# Patient Record
Sex: FEMALE | Race: WHITE | NOT HISPANIC OR LATINO | Employment: PART TIME | URBAN - METROPOLITAN AREA
[De-identification: names, ages, dates, MRNs, and addresses within clinical notes are randomized per-mention and may not be internally consistent; named-entity substitution may affect disease eponyms.]

---

## 2017-01-13 ENCOUNTER — HOSPITAL ENCOUNTER (EMERGENCY)
Facility: HOSPITAL | Age: 27
Discharge: HOME | End: 2017-01-13
Attending: EMERGENCY MEDICINE | Admitting: EMERGENCY MEDICINE
Payer: COMMERCIAL

## 2017-01-13 ENCOUNTER — APPOINTMENT (EMERGENCY)
Dept: CT IMAGING | Facility: HOSPITAL | Age: 27
End: 2017-01-13
Payer: COMMERCIAL

## 2017-01-13 VITALS
OXYGEN SATURATION: 100 % | WEIGHT: 129.2 LBS | HEART RATE: 81 BPM | RESPIRATION RATE: 16 BRPM | SYSTOLIC BLOOD PRESSURE: 115 MMHG | TEMPERATURE: 98.3 F | DIASTOLIC BLOOD PRESSURE: 78 MMHG

## 2017-01-13 DIAGNOSIS — G43.909 MIGRAINE WITHOUT STATUS MIGRAINOSUS, NOT INTRACTABLE, UNSPECIFIED MIGRAINE TYPE: Primary | ICD-10-CM

## 2017-01-13 LAB
ALBUMIN SERPL BCP-MCNC: 4 G/DL (ref 3.5–5)
ALP SERPL-CCNC: 69 U/L (ref 46–116)
ALT SERPL W P-5'-P-CCNC: 23 U/L (ref 12–78)
ANION GAP SERPL CALCULATED.3IONS-SCNC: 9 MMOL/L (ref 4–13)
AST SERPL W P-5'-P-CCNC: 15 U/L (ref 5–45)
BASOPHILS # BLD AUTO: 0.04 THOUSANDS/ΜL (ref 0–0.1)
BASOPHILS NFR BLD AUTO: 1 % (ref 0–1)
BILIRUB SERPL-MCNC: 0.5 MG/DL (ref 0.2–1)
BUN SERPL-MCNC: 12 MG/DL (ref 5–25)
CALCIUM SERPL-MCNC: 8.9 MG/DL (ref 8.3–10.1)
CHLORIDE SERPL-SCNC: 101 MMOL/L (ref 100–108)
CLARITY, POC: CLEAR
CO2 SERPL-SCNC: 25 MMOL/L (ref 21–32)
COLOR, POC: YELLOW
CREAT SERPL-MCNC: 0.64 MG/DL (ref 0.6–1.3)
EOSINOPHIL # BLD AUTO: 0.07 THOUSAND/ΜL (ref 0–0.61)
EOSINOPHIL NFR BLD AUTO: 1 % (ref 0–6)
ERYTHROCYTE [DISTWIDTH] IN BLOOD BY AUTOMATED COUNT: 13.3 % (ref 11.6–15.1)
EXT BILIRUBIN, UA: NEGATIVE
EXT BLOOD URINE: NEGATIVE
EXT GLUCOSE, UA: NEGATIVE
EXT KETONES: NORMAL
EXT NITRITE, UA: NEGATIVE
EXT PH, UA: 7.5
EXT PROTEIN, UA: NORMAL
EXT SPECIFIC GRAVITY, UA: 1.01
EXT UROBILINOGEN: NEGATIVE
GFR SERPL CREATININE-BSD FRML MDRD: >60 ML/MIN/1.73SQ M
GLUCOSE SERPL-MCNC: 83 MG/DL (ref 65–140)
GLUCOSE SERPL-MCNC: 91 MG/DL (ref 65–140)
HCG UR QL: NEGATIVE
HCT VFR BLD AUTO: 37.6 % (ref 34.8–46.1)
HGB BLD-MCNC: 12.1 G/DL (ref 11.5–15.4)
LYMPHOCYTES # BLD AUTO: 1.39 THOUSANDS/ΜL (ref 0.6–4.47)
LYMPHOCYTES NFR BLD AUTO: 17 % (ref 14–44)
MCH RBC QN AUTO: 24.8 PG (ref 26.8–34.3)
MCHC RBC AUTO-ENTMCNC: 32.2 G/DL (ref 31.4–37.4)
MCV RBC AUTO: 77 FL (ref 82–98)
MONOCYTES # BLD AUTO: 0.39 THOUSAND/ΜL (ref 0.17–1.22)
MONOCYTES NFR BLD AUTO: 5 % (ref 4–12)
NEUTROPHILS # BLD AUTO: 6.43 THOUSANDS/ΜL (ref 1.85–7.62)
NEUTS SEG NFR BLD AUTO: 76 % (ref 43–75)
PLATELET # BLD AUTO: 299 THOUSANDS/UL (ref 149–390)
PMV BLD AUTO: 10.2 FL (ref 8.9–12.7)
POTASSIUM SERPL-SCNC: 3.6 MMOL/L (ref 3.5–5.3)
PROT SERPL-MCNC: 7.7 G/DL (ref 6.4–8.2)
RBC # BLD AUTO: 4.87 MILLION/UL (ref 3.81–5.12)
SODIUM SERPL-SCNC: 135 MMOL/L (ref 136–145)
WBC # BLD AUTO: 8.32 THOUSAND/UL (ref 4.31–10.16)
WBC # BLD EST: NEGATIVE 10*3/UL

## 2017-01-13 PROCEDURE — 96374 THER/PROPH/DIAG INJ IV PUSH: CPT

## 2017-01-13 PROCEDURE — 96375 TX/PRO/DX INJ NEW DRUG ADDON: CPT

## 2017-01-13 PROCEDURE — 70450 CT HEAD/BRAIN W/O DYE: CPT

## 2017-01-13 PROCEDURE — 36415 COLL VENOUS BLD VENIPUNCTURE: CPT | Performed by: PHYSICIAN ASSISTANT

## 2017-01-13 PROCEDURE — 82948 REAGENT STRIP/BLOOD GLUCOSE: CPT

## 2017-01-13 PROCEDURE — 85025 COMPLETE CBC W/AUTO DIFF WBC: CPT | Performed by: PHYSICIAN ASSISTANT

## 2017-01-13 PROCEDURE — 99284 EMERGENCY DEPT VISIT MOD MDM: CPT

## 2017-01-13 PROCEDURE — 81025 URINE PREGNANCY TEST: CPT | Performed by: PHYSICIAN ASSISTANT

## 2017-01-13 PROCEDURE — 80053 COMPREHEN METABOLIC PANEL: CPT | Performed by: PHYSICIAN ASSISTANT

## 2017-01-13 PROCEDURE — 96360 HYDRATION IV INFUSION INIT: CPT

## 2017-01-13 PROCEDURE — 81002 URINALYSIS NONAUTO W/O SCOPE: CPT | Performed by: PHYSICIAN ASSISTANT

## 2017-01-13 PROCEDURE — 96361 HYDRATE IV INFUSION ADD-ON: CPT

## 2017-01-13 RX ORDER — KETOROLAC TROMETHAMINE 30 MG/ML
30 INJECTION, SOLUTION INTRAMUSCULAR; INTRAVENOUS ONCE
Status: COMPLETED | OUTPATIENT
Start: 2017-01-13 | End: 2017-01-13

## 2017-01-13 RX ORDER — METOCLOPRAMIDE HYDROCHLORIDE 5 MG/ML
10 INJECTION INTRAMUSCULAR; INTRAVENOUS ONCE
Status: COMPLETED | OUTPATIENT
Start: 2017-01-13 | End: 2017-01-13

## 2017-01-13 RX ORDER — DIPHENHYDRAMINE HYDROCHLORIDE 50 MG/ML
25 INJECTION INTRAMUSCULAR; INTRAVENOUS ONCE
Status: COMPLETED | OUTPATIENT
Start: 2017-01-13 | End: 2017-01-13

## 2017-01-13 RX ADMIN — SODIUM CHLORIDE 1000 ML: 0.9 INJECTION, SOLUTION INTRAVENOUS at 14:54

## 2017-01-13 RX ADMIN — METOCLOPRAMIDE 10 MG: 5 INJECTION, SOLUTION INTRAMUSCULAR; INTRAVENOUS at 15:11

## 2017-01-13 RX ADMIN — DIPHENHYDRAMINE HYDROCHLORIDE 25 MG: 50 INJECTION, SOLUTION INTRAMUSCULAR; INTRAVENOUS at 15:10

## 2017-01-13 RX ADMIN — KETOROLAC TROMETHAMINE 30 MG: 30 INJECTION, SOLUTION INTRAMUSCULAR at 16:13

## 2017-01-16 ENCOUNTER — ALLSCRIPTS OFFICE VISIT (OUTPATIENT)
Dept: OTHER | Facility: OTHER | Age: 27
End: 2017-01-16

## 2017-01-16 ENCOUNTER — TRANSCRIBE ORDERS (OUTPATIENT)
Dept: ADMINISTRATIVE | Facility: HOSPITAL | Age: 27
End: 2017-01-16

## 2017-01-16 DIAGNOSIS — R93.0 ABNORMAL FINDINGS ON DIAGNOSTIC IMAGING OF SKULL AND HEAD, NOT ELSEWHERE CLASSIFIED: ICD-10-CM

## 2017-01-16 DIAGNOSIS — G43.909 MIGRAINE WITHOUT STATUS MIGRAINOSUS, NOT INTRACTABLE, UNSPECIFIED MIGRAINE TYPE: ICD-10-CM

## 2017-01-16 DIAGNOSIS — R93.0 FAMILIAL ENLARGEMENT OF THE SELLA TURCICA: Primary | ICD-10-CM

## 2017-01-16 DIAGNOSIS — H53.9 UNSPECIFIED VISUAL DISTURBANCE: ICD-10-CM

## 2017-01-16 DIAGNOSIS — G43.909 MIGRAINE WITHOUT STATUS MIGRAINOSUS, NOT INTRACTABLE: ICD-10-CM

## 2017-01-16 DIAGNOSIS — H53.9 VISUAL DISTURBANCE: ICD-10-CM

## 2017-02-17 ENCOUNTER — HOSPITAL ENCOUNTER (OUTPATIENT)
Dept: MRI IMAGING | Facility: HOSPITAL | Age: 27
Discharge: HOME | End: 2017-02-17
Payer: COMMERCIAL

## 2017-02-17 DIAGNOSIS — R93.0 ABNORMAL FINDINGS ON DIAGNOSTIC IMAGING OF SKULL AND HEAD, NOT ELSEWHERE CLASSIFIED: ICD-10-CM

## 2017-02-17 DIAGNOSIS — G43.909 MIGRAINE WITHOUT STATUS MIGRAINOSUS, NOT INTRACTABLE: ICD-10-CM

## 2017-02-17 DIAGNOSIS — H53.9 VISUAL DISTURBANCE: ICD-10-CM

## 2017-02-17 PROCEDURE — 70553 MRI BRAIN STEM W/O & W/DYE: CPT

## 2017-02-17 PROCEDURE — A9585 GADOBUTROL INJECTION: HCPCS | Performed by: NURSE PRACTITIONER

## 2017-02-17 RX ADMIN — GADOBUTROL 5 ML: 604.72 INJECTION INTRAVENOUS at 15:45

## 2017-02-20 ENCOUNTER — GENERIC CONVERSION - ENCOUNTER (OUTPATIENT)
Dept: OTHER | Facility: OTHER | Age: 27
End: 2017-02-20

## 2017-10-24 ENCOUNTER — ALLSCRIPTS OFFICE VISIT (OUTPATIENT)
Dept: OTHER | Facility: OTHER | Age: 27
End: 2017-10-24

## 2017-10-25 ENCOUNTER — HOSPITAL ENCOUNTER (EMERGENCY)
Facility: HOSPITAL | Age: 27
Discharge: HOME | End: 2017-10-25
Admitting: EMERGENCY MEDICINE
Payer: COMMERCIAL

## 2017-10-25 ENCOUNTER — APPOINTMENT (EMERGENCY)
Dept: RADIOLOGY | Facility: HOSPITAL | Age: 27
End: 2017-10-25
Payer: COMMERCIAL

## 2017-10-25 VITALS
DIASTOLIC BLOOD PRESSURE: 56 MMHG | TEMPERATURE: 98.3 F | RESPIRATION RATE: 20 BRPM | HEART RATE: 102 BPM | OXYGEN SATURATION: 97 % | SYSTOLIC BLOOD PRESSURE: 102 MMHG

## 2017-10-25 DIAGNOSIS — B34.9 VIRAL SYNDROME: Primary | ICD-10-CM

## 2017-10-25 LAB
ALBUMIN SERPL BCP-MCNC: 3.9 G/DL (ref 3.5–5)
ALP SERPL-CCNC: 89 U/L (ref 46–116)
ALT SERPL W P-5'-P-CCNC: 99 U/L (ref 12–78)
ANION GAP SERPL CALCULATED.3IONS-SCNC: 7 MMOL/L (ref 4–13)
AST SERPL W P-5'-P-CCNC: 38 U/L (ref 5–45)
BASOPHILS # BLD AUTO: 0.11 THOUSANDS/ΜL (ref 0–0.1)
BASOPHILS NFR BLD AUTO: 2 % (ref 0–1)
BILIRUB SERPL-MCNC: 0.9 MG/DL (ref 0.2–1)
BUN SERPL-MCNC: 10 MG/DL (ref 5–25)
CALCIUM SERPL-MCNC: 9 MG/DL (ref 8.3–10.1)
CHLORIDE SERPL-SCNC: 103 MMOL/L (ref 100–108)
CO2 SERPL-SCNC: 28 MMOL/L (ref 21–32)
CREAT SERPL-MCNC: 0.74 MG/DL (ref 0.6–1.3)
EOSINOPHIL # BLD AUTO: 0.13 THOUSAND/ΜL (ref 0–0.61)
EOSINOPHIL NFR BLD AUTO: 3 % (ref 0–6)
ERYTHROCYTE [DISTWIDTH] IN BLOOD BY AUTOMATED COUNT: 13.7 % (ref 11.6–15.1)
EXT PREG TEST URINE: NEGATIVE
GFR SERPL CREATININE-BSD FRML MDRD: 112 ML/MIN/1.73SQ M
GLUCOSE SERPL-MCNC: 85 MG/DL (ref 65–140)
HCT VFR BLD AUTO: 35.3 % (ref 34.8–46.1)
HGB BLD-MCNC: 11.9 G/DL (ref 11.5–15.4)
LYMPHOCYTES # BLD AUTO: 2.23 THOUSANDS/ΜL (ref 0.6–4.47)
LYMPHOCYTES NFR BLD AUTO: 43 % (ref 14–44)
MCH RBC QN AUTO: 27.1 PG (ref 26.8–34.3)
MCHC RBC AUTO-ENTMCNC: 33.7 G/DL (ref 31.4–37.4)
MCV RBC AUTO: 80 FL (ref 82–98)
MONOCYTES # BLD AUTO: 0.29 THOUSAND/ΜL (ref 0.17–1.22)
MONOCYTES NFR BLD AUTO: 6 % (ref 4–12)
NEUTROPHILS # BLD AUTO: 2.48 THOUSANDS/ΜL (ref 1.85–7.62)
NEUTS SEG NFR BLD AUTO: 46 % (ref 43–75)
PLATELET # BLD AUTO: 173 THOUSANDS/UL (ref 149–390)
PMV BLD AUTO: 9.4 FL (ref 8.9–12.7)
POTASSIUM SERPL-SCNC: 3.7 MMOL/L (ref 3.5–5.3)
PROT SERPL-MCNC: 7.8 G/DL (ref 6.4–8.2)
RBC # BLD AUTO: 4.39 MILLION/UL (ref 3.81–5.12)
S PYO AG THROAT QL: NEGATIVE
SODIUM SERPL-SCNC: 138 MMOL/L (ref 136–145)
WBC # BLD AUTO: 5.24 THOUSAND/UL (ref 4.31–10.16)

## 2017-10-25 PROCEDURE — 99284 EMERGENCY DEPT VISIT MOD MDM: CPT

## 2017-10-25 PROCEDURE — 36415 COLL VENOUS BLD VENIPUNCTURE: CPT | Performed by: PHYSICIAN ASSISTANT

## 2017-10-25 PROCEDURE — 71020 HB CHEST X-RAY 2VW FRONTAL&LATL: CPT

## 2017-10-25 PROCEDURE — 87147 CULTURE TYPE IMMUNOLOGIC: CPT | Performed by: PHYSICIAN ASSISTANT

## 2017-10-25 PROCEDURE — 87430 STREP A AG IA: CPT | Performed by: PHYSICIAN ASSISTANT

## 2017-10-25 PROCEDURE — 85025 COMPLETE CBC W/AUTO DIFF WBC: CPT | Performed by: PHYSICIAN ASSISTANT

## 2017-10-25 PROCEDURE — 87070 CULTURE OTHR SPECIMN AEROBIC: CPT | Performed by: PHYSICIAN ASSISTANT

## 2017-10-25 PROCEDURE — 86308 HETEROPHILE ANTIBODY SCREEN: CPT | Performed by: PHYSICIAN ASSISTANT

## 2017-10-25 PROCEDURE — 96374 THER/PROPH/DIAG INJ IV PUSH: CPT

## 2017-10-25 PROCEDURE — 96361 HYDRATE IV INFUSION ADD-ON: CPT

## 2017-10-25 PROCEDURE — 81025 URINE PREGNANCY TEST: CPT | Performed by: PHYSICIAN ASSISTANT

## 2017-10-25 PROCEDURE — 80053 COMPREHEN METABOLIC PANEL: CPT | Performed by: PHYSICIAN ASSISTANT

## 2017-10-25 RX ORDER — ONDANSETRON 2 MG/ML
4 INJECTION INTRAMUSCULAR; INTRAVENOUS ONCE
Status: COMPLETED | OUTPATIENT
Start: 2017-10-25 | End: 2017-10-25

## 2017-10-25 RX ORDER — ONDANSETRON 4 MG/1
4 TABLET, FILM COATED ORAL EVERY 8 HOURS PRN
Qty: 10 TABLET | Refills: 0 | Status: SHIPPED | OUTPATIENT
Start: 2017-10-25 | End: 2018-03-13

## 2017-10-25 RX ADMIN — ONDANSETRON 4 MG: 2 INJECTION INTRAMUSCULAR; INTRAVENOUS at 21:11

## 2017-10-25 RX ADMIN — SODIUM CHLORIDE 1000 ML: 0.9 INJECTION, SOLUTION INTRAVENOUS at 21:10

## 2017-10-25 NOTE — PROGRESS NOTES
Assessment  1  Acute upper respiratory infection (465 9) (J06 9)   2  Viral gastroenteritis (008 8) (A08 4)    Plan  Viral gastroenteritis    · Avoid caffeine for 2 weeks ; Status:Complete;   Done: [id number]   · Drink at least 6 glasses of clear liquids a day ; Status:Complete;   Done: 86TGZ7969   · Good hand washing is one of the best ways to control the spread of germs ;  Status:Complete;   Done: 91DIR1931   · We suggest that you follow a special diet until your diarrhea is better ; Status:Complete;    Done: 55FBB4013   · Call [phone] if: The symptoms are not better in 7 days ; Status:Complete;   Done:  49GTS6146   · Call [phone] if: Your temperature is higher than 101F ; Status:Complete;   Done:  08SNP0282   · Seek Immediate Medical Attention if: You have pain in your abdomen ; Status:Complete;    Done: 03THC5670    Discussion/Summary    Discussed with her symptomatic care and lot of fluids and Tylenol for pain her ear exam is normal abdomen is nontender and lungs are clear her throat is not showing any exudate or erythema  if not better  The patient was counseled regarding instructions for management,-- impressions,-- importance of compliance with treatment  Chief Complaint  Throwing up, throat and left ear ache  History of Present Illness  HPI: Pt presented today c/o being sick for one day  Pt states she was vomiting for half of the day yesterday, approximately 6 times  Pt states today she has no vomiting but has had one episode of diarrhea  Pt has had nausea associated with this  Pt denies any blood in the watery, loose stools  Pt also complaining of sore throat today and L ear pain  Pt stated she has had chills and body aches but no reported fevers  Pt reported some headaches and neck pain as well  Pt denied any recent sick contacts   says her symptoms started with nasal congestion and little bit cough for few days      Review of Systems    Constitutional: as noted in HPI    ENT: as noted in HPI  Cardiovascular: no complaints of slow or fast heart rate, no chest pain, no palpitations, no leg claudication or lower extremity edema  Respiratory: no complaints of shortness of breath, no wheezing, no dyspnea on exertion, no orthopnea or PND  Gastrointestinal: no complaints of abdominal pain, no constipation, no nausea or diarrhea, no vomiting, no bloody stools  Genitourinary: no complaints of dysuria, no incontinence, no pelvic pain, no dysmenorrhea, no vaginal discharge or abnormal vaginal bleeding  Musculoskeletal: no complaints of arthralgia, no myalgia, no joint swelling or stiffness, no limb pain or swelling  Integumentary: no complaints of skin rash or lesion, no itching or dry skin, no skin wounds  Neurological: no complaints of headache, no confusion, no numbness or tingling, no dizziness or fainting  ROS reviewed  Active Problems  1  Abnormal CT scan, head (793 0) (R93 0)   2  Encounter for gynecological examination (V72 31) (Z01 419)   3  Migraine (346 90) (G43 909)   4  Previous  section complicating pregnancy, antepartum condition or   complication (835 63) (N43 740)   5  Vaginal yeast infection (112 1) (B37 3)   6  Vision changes (368 9) (H53 9)    Past Medical History  1  History of 1st trimester screening (V28 89) (Z36 9)   2  History of Acute reaction to stress (308 9) (F43 0)   3  History of Acute upper respiratory infection (465 9) (J06 9)   4  History of Acute upper respiratory infection (465 9) (J06 9)   5  History of Cough (786 2) (R05)   6  History of acute bronchitis (V12 69) (Z87 09)   7  History of acute pharyngitis (V12 69) (Z87 09)   8  History of acute sinusitis (V12 69) (Z87 09)   9  History of allergic rhinitis (V12 69) (Z87 09)   10  History of gestational diabetes (V12 21) (Z86 32)   11  History of gestational diabetes (V12 21) (Z86 32)   12  History of paronychia of finger (V13 3) (Z87 2)   13   History of viral gastroenteritis (V12 09) (Z86 19)   14  History of Hyperemesis gravidarum, antepartum (643 03) (O21 0)   15  History of Impacted cerumen, unspecified laterality (380 4) (H61 20)   16  History of Lump in neck (784 2) (R22 1)   17  History of Palpitations (785 1) (R00 2)   18  History of Vaginal yeast infection (112 1) (B37 3)   19  History of Well adult on routine health check (V70 0) (Z00 00)  Active Problems And Past Medical History Reviewed: The active problems and past medical history were reviewed and updated today  Family History  Mother    1  Family history of Diabetes (250 00) (E11 9)   2  Family history of depression (V17 0) (Z81 8)  Family History    3  Family history of Depression (311) (F32 9)    Social History   · Former smoker (X63 73) (W46 566)   · No alcohol use  The social history was reviewed and updated today  Surgical History  1  History of Appendectomy   2  History of Appendectomy   3  History of  Section Low Transverse   4  History of  Section Low Transverse   5  History of Tubal Ligation    Current Meds   1  Iron 240 (27 Fe) MG Oral Tablet; Therapy: 51KTE1113 to Recorded    The medication list was reviewed and updated today  Allergies  1  Amoxicillin TABS   2  Vicodin TABS   3  ZyrTEC Allergy TABS   4  AMOXICILLIN   5  Amoxicillin TABS   6  Vicodin TABS   7  Zyrtec TABS  8  Other    Vitals   Recorded: 47ZBJ1437 07:10PM   Heart Rate 84   Respiration 17   Systolic 857   Diastolic 70   Height 4 ft 11 in   Weight 138 lb 4 oz   BMI Calculated 27 92   BSA Calculated 1 58   O2 Saturation 97     Physical Exam    Constitutional   General appearance: No acute distress, well appearing and well nourished  Eyes   Conjunctiva and lids: No swelling, erythema or discharge  Pupils and irises: Equal, round and reactive to light  Ears, Nose, Mouth, and Throat   External inspection of ears and nose: Normal     Otoscopic examination: Tympanic membranes translucent with normal light reflex   Canals patent without erythema  Nasal mucosa, septum, and turbinates: Normal without edema or erythema  Oropharynx: Normal with no erythema, edema, exudate or lesions  Pulmonary   Respiratory effort: No increased work of breathing or signs of respiratory distress  Auscultation of lungs: Clear to auscultation  Cardiovascular   Palpation of heart: Normal PMI, no thrills  Auscultation of heart: Normal rate and rhythm, normal S1 and S2, without murmurs  Abdomen   Abdomen: Non-tender, no masses  Liver and spleen: No hepatomegaly or splenomegaly  Lymphatic   Palpation of lymph nodes in neck: No lymphadenopathy  Musculoskeletal   Gait and station: Normal     Inspection/palpation of joints, bones, and muscles: Normal     Skin   Skin and subcutaneous tissue: Normal without rashes or lesions  Neurologic   Cranial nerves: Cranial nerves 2-12 intact  Sensation: No sensory loss      Psychiatric   Orientation to person, place, and time: Normal          Signatures   Electronically signed by : NARENDRA Gee ; Oct 24 2017  7:45PM EST                       (Author)

## 2017-10-26 LAB — HETEROPH AB SER QL: POSITIVE

## 2017-10-26 NOTE — ED PROVIDER NOTES
History  Chief Complaint   Patient presents with    Vomiting     Pt c/o N/V/D w/ a sore throat, runny nose and ear pain starting a week ago, states her cousin had [place] and wants to get checked  Denies fevers   URI     This is a 52-year-old female patient presents nausea vomiting and diarrhea with a sore throat runny nose and left ear pain for the last 3 days  She has been able to keep fluids down but is still struggling with food due to nausea  Has chills no fevers were checked  No cough no congestion just a bit of a runny nose  No headache no blurred vision or double vision  Her throat hurts when she swallows but she is able she is not drooling  She has ear pain but is able to hear  She is concerned because her cousin was diagnosed with mononucleosis  She has no abdominal pain no urgency frequency or dysuria  Differential diagnosis includes not limited to otitis media, strep throat, mononucleosis, viral syndrome  Patient does have a tubal ligation            None       History reviewed  No pertinent past medical history  Past Surgical History:   Procedure Laterality Date    APPENDECTOMY       SECTION      two       History reviewed  No pertinent family history  I have reviewed and agree with the history as documented  Social History   Substance Use Topics    Smoking status: Never Smoker    Smokeless tobacco: Not on file    Alcohol use No        Review of Systems   All other systems reviewed and are negative        Physical Exam  ED Triage Vitals [10/25/17 181]   Temperature Pulse Respirations Blood Pressure SpO2   98 3 °F (36 8 °C) (!) 111 18 149/77 99 %      Temp Source Heart Rate Source Patient Position - Orthostatic VS BP Location FiO2 (%)   Oral Monitor Sitting Left arm --      Pain Score       --           Orthostatic Vital Signs  Vitals:    10/25/17 1818 10/25/17 2232   BP: 149/77 128/67   Pulse: (!) 111 95   Patient Position - Orthostatic VS: Sitting Lying       Physical Exam   Constitutional: She appears well-developed and well-nourished  HENT:   Head: Normocephalic and atraumatic  Right Ear: External ear normal    Left Ear: External ear normal    Nose: Nose normal    Mouth/Throat: Uvula is midline and mucous membranes are normal  Posterior oropharyngeal erythema present  No tonsillar abscesses  Tonsils are 2+ on the right  Tonsils are 2+ on the left  No tonsillar exudate  Eyes: Conjunctivae are normal  Pupils are equal, round, and reactive to light  Neck: Normal range of motion  Neck supple  Cardiovascular: Normal rate and regular rhythm  Pulmonary/Chest: Effort normal and breath sounds normal    Abdominal: Soft  Bowel sounds are normal  There is no tenderness  Lymphadenopathy:     She has cervical adenopathy  Neurological: She is alert  Skin: Skin is warm  Psychiatric: She has a normal mood and affect  Her behavior is normal    Nursing note and vitals reviewed        ED Medications  Medications   sodium chloride 0 9 % bolus 1,000 mL (0 mL Intravenous Stopped 10/25/17 2249)   ondansetron (ZOFRAN) injection 4 mg (4 mg Intravenous Given 10/25/17 2111)       Diagnostic Studies  Results Reviewed     Procedure Component Value Units Date/Time    Comprehensive metabolic panel [[id number]]  (Abnormal) Collected:  10/25/17 2109    Lab Status:  Final result Specimen:  Blood from Arm, Right Updated:  10/25/17 2143     Sodium 138 mmol/L      Potassium 3 7 mmol/L      Chloride 103 mmol/L      CO2 28 mmol/L      Anion Gap 7 mmol/L      BUN 10 mg/dL      Creatinine 0 74 mg/dL      Glucose 85 mg/dL      Calcium 9 0 mg/dL      AST 38 U/L      ALT 99 (H) U/L      Alkaline Phosphatase 89 U/L      Total Protein 7 8 g/dL      Albumin 3 9 g/dL      Total Bilirubin 0 90 mg/dL      eGFR 112 ml/min/1 73sq m     Narrative:         National Kidney Disease Education Program recommendations are as follows:  GFR calculation is accurate only with a steady state creatinine  Chronic Kidney disease less than 60 ml/min/1 73 sq  meters  Kidney failure less than 15 ml/min/1 73 sq  meters  Rapid Beta strep screen [[id number]]  (Normal) Collected:  10/25/17 2130    Lab Status:  Final result Specimen:  Throat from Throat Updated:  10/25/17 2142     Rapid Strep A Screen Negative    Throat culture [[id number]] Collected:  10/25/17 2130    Lab Status: In process Specimen:  Throat from Throat Updated:  10/25/17 2142    CBC and differential [[id number]]  (Abnormal) Collected:  10/25/17 2109    Lab Status:  Final result Specimen:  Blood from Arm, Right Updated:  10/25/17 2121     WBC 5 24 Thousand/uL      RBC 4 39 Million/uL      Hemoglobin 11 9 g/dL      Hematocrit 35 3 %      MCV 80 (L) fL      MCH 27 1 pg      MCHC 33 7 g/dL      RDW 13 7 %      MPV 9 4 fL      Platelets 520 Thousands/uL      Neutrophils Relative 46 %      Lymphocytes Relative 43 %      Monocytes Relative 6 %      Eosinophils Relative 3 %      Basophils Relative 2 (H) %      Neutrophils Absolute 2 48 Thousands/µL      Lymphocytes Absolute 2 23 Thousands/µL      Monocytes Absolute 0 29 Thousand/µL      Eosinophils Absolute 0 13 Thousand/µL      Basophils Absolute 0 11 (H) Thousands/µL     Mononucleosis screen [[id number]] Collected:  10/25/17 2109    Lab Status: In process Specimen:  Blood from Arm, Right Updated:  10/25/17 2116    POCT pregnancy, urine [[id number]]  (Normal) Resulted:  10/25/17 2110    Lab Status:  Final result Updated:  10/25/17 2110     EXT PREG TEST UR (Ref: Negative) Negative                 XR chest 2 views   ED Interpretation by Sarah Hurt PA-C (10/25 2307)   nad                 Procedures  Procedures       Phone Contacts  ED Phone Contact    ED Course  ED Course                                MDM  Number of Diagnoses or Management Options  Diagnosis management comments: This 52-year-old patient whose had several days of vomiting and diarrhea some congestion ear pain and sore throat    She was given Zofran and fluids she states she feels considerably better  Negative rapid strep negative chest x-ray negative atypical lymphocytes no significant transaminitis  Patient be discharged viral syndrome we will be awaiting Monospot  Amount and/or Complexity of Data Reviewed  Clinical lab tests: ordered and reviewed  Tests in the radiology section of CPT®: ordered and reviewed      CritCare Time    Disposition  Final diagnoses:   Viral syndrome     Time reflects when diagnosis was documented in both MDM as applicable and the Disposition within this note     Time User Action Codes Description Comment    10/25/2017 11:09 PM Dinjordani, [address] [facility] Add [B34 9] Viral syndrome       ED Disposition     ED Disposition Condition Comment    Discharge  Xochitl Chun discharge to home/self care  Condition at discharge: Good        Follow-up Information     Follow up With Specialties Details Why [address], [address] [facility] Schedule an appointment as soon as possible for a visit  [address]  [phone]          Patient's Medications   Discharge Prescriptions    ONDANSETRON (ZOFRAN) 4 MG TABLET    Take 1 tablet by mouth every 8 (eight) hours as needed for nausea or vomiting for up to 3 days       Start Date: 10/25/2017End Date: 10/28/2017       Order Dose: 4 mg       Quantity: 10 tablet    Refills: 0     No discharge procedures on file      ED Provider  Electronically Signed by           Celeste Hogan PA-C  10/25/17 9865

## 2017-10-27 ENCOUNTER — APPOINTMENT (EMERGENCY)
Dept: CT IMAGING | Facility: HOSPITAL | Age: 27
End: 2017-10-27
Payer: COMMERCIAL

## 2017-10-27 ENCOUNTER — HOSPITAL ENCOUNTER (EMERGENCY)
Facility: HOSPITAL | Age: 27
Discharge: HOME | End: 2017-10-28
Attending: EMERGENCY MEDICINE | Admitting: EMERGENCY MEDICINE
Payer: COMMERCIAL

## 2017-10-27 DIAGNOSIS — R16.1 SPLENOMEGALY: Primary | ICD-10-CM

## 2017-10-27 DIAGNOSIS — R10.9 ABDOMINAL PAIN: ICD-10-CM

## 2017-10-27 LAB
ALBUMIN SERPL BCP-MCNC: 3.6 G/DL (ref 3.5–5)
ALP SERPL-CCNC: 89 U/L (ref 46–116)
ALT SERPL W P-5'-P-CCNC: 101 U/L (ref 12–78)
ANION GAP SERPL CALCULATED.3IONS-SCNC: 9 MMOL/L (ref 4–13)
AST SERPL W P-5'-P-CCNC: 54 U/L (ref 5–45)
BASOPHILS # BLD MANUAL: 0 THOUSAND/UL (ref 0–0.1)
BASOPHILS NFR MAR MANUAL: 0 % (ref 0–1)
BILIRUB SERPL-MCNC: 0.7 MG/DL (ref 0.2–1)
BUN SERPL-MCNC: 8 MG/DL (ref 5–25)
CALCIUM SERPL-MCNC: 8.7 MG/DL (ref 8.3–10.1)
CHLORIDE SERPL-SCNC: 104 MMOL/L (ref 100–108)
CLARITY, POC: CLEAR
CO2 SERPL-SCNC: 27 MMOL/L (ref 21–32)
COLOR, POC: YELLOW
CREAT SERPL-MCNC: 0.84 MG/DL (ref 0.6–1.3)
EOSINOPHIL # BLD MANUAL: 0.15 THOUSAND/UL (ref 0–0.4)
EOSINOPHIL NFR BLD MANUAL: 3 % (ref 0–6)
ERYTHROCYTE [DISTWIDTH] IN BLOOD BY AUTOMATED COUNT: 13.9 % (ref 11.6–15.1)
EXT BILIRUBIN, UA: NEGATIVE
EXT BLOOD URINE: ABNORMAL
EXT GLUCOSE, UA: NEGATIVE
EXT KETONES: NEGATIVE
EXT NITRITE, UA: NEGATIVE
EXT PH, UA: 7
EXT PREG TEST URINE: NEGATIVE
EXT PROTEIN, UA: ABNORMAL
EXT SPECIFIC GRAVITY, UA: 1.01
EXT UROBILINOGEN: NEGATIVE
GFR SERPL CREATININE-BSD FRML MDRD: 96 ML/MIN/1.73SQ M
GLUCOSE SERPL-MCNC: 99 MG/DL (ref 65–140)
HCT VFR BLD AUTO: 32.8 % (ref 34.8–46.1)
HGB BLD-MCNC: 11.1 G/DL (ref 11.5–15.4)
LIPASE SERPL-CCNC: 157 U/L (ref 73–393)
LYMPHOCYTES # BLD AUTO: 2.37 THOUSAND/UL (ref 0.6–4.47)
LYMPHOCYTES # BLD AUTO: 47 % (ref 14–44)
MCH RBC QN AUTO: 27.1 PG (ref 26.8–34.3)
MCHC RBC AUTO-ENTMCNC: 33.8 G/DL (ref 31.4–37.4)
MCV RBC AUTO: 80 FL (ref 82–98)
METAMYELOCYTES NFR BLD MANUAL: 2 % (ref 0–1)
MONOCYTES # BLD AUTO: 0.2 THOUSAND/UL (ref 0–1.22)
MONOCYTES NFR BLD: 4 % (ref 4–12)
NEUTROPHILS # BLD MANUAL: 2.07 THOUSAND/UL (ref 1.85–7.62)
NEUTS BAND NFR BLD MANUAL: 7 % (ref 0–8)
NEUTS SEG NFR BLD AUTO: 34 % (ref 43–75)
PLATELET # BLD AUTO: 177 THOUSANDS/UL (ref 149–390)
PLATELET BLD QL SMEAR: ADEQUATE
PMV BLD AUTO: 9.3 FL (ref 8.9–12.7)
POTASSIUM SERPL-SCNC: 3.5 MMOL/L (ref 3.5–5.3)
PROT SERPL-MCNC: 7.4 G/DL (ref 6.4–8.2)
RBC # BLD AUTO: 4.09 MILLION/UL (ref 3.81–5.12)
SODIUM SERPL-SCNC: 140 MMOL/L (ref 136–145)
TOTAL CELLS COUNTED SPEC: 100
VARIANT LYMPHS # BLD AUTO: 3 %
WBC # BLD AUTO: 5.05 THOUSAND/UL (ref 4.31–10.16)
WBC # BLD EST: ABNORMAL 10*3/UL

## 2017-10-27 PROCEDURE — 85007 BL SMEAR W/DIFF WBC COUNT: CPT | Performed by: EMERGENCY MEDICINE

## 2017-10-27 PROCEDURE — 81025 URINE PREGNANCY TEST: CPT | Performed by: EMERGENCY MEDICINE

## 2017-10-27 PROCEDURE — 85027 COMPLETE CBC AUTOMATED: CPT | Performed by: EMERGENCY MEDICINE

## 2017-10-27 PROCEDURE — 36415 COLL VENOUS BLD VENIPUNCTURE: CPT | Performed by: EMERGENCY MEDICINE

## 2017-10-27 PROCEDURE — 83690 ASSAY OF LIPASE: CPT | Performed by: EMERGENCY MEDICINE

## 2017-10-27 PROCEDURE — 96361 HYDRATE IV INFUSION ADD-ON: CPT

## 2017-10-27 PROCEDURE — 74177 CT ABD & PELVIS W/CONTRAST: CPT

## 2017-10-27 PROCEDURE — 80053 COMPREHEN METABOLIC PANEL: CPT | Performed by: EMERGENCY MEDICINE

## 2017-10-27 PROCEDURE — 81002 URINALYSIS NONAUTO W/O SCOPE: CPT | Performed by: EMERGENCY MEDICINE

## 2017-10-27 RX ORDER — FERROUS SULFATE 325(65) MG
325 TABLET ORAL
COMMUNITY
End: 2017-11-01

## 2017-10-27 RX ADMIN — SODIUM CHLORIDE 1000 ML: 0.9 INJECTION, SOLUTION INTRAVENOUS at 22:33

## 2017-10-27 NOTE — PROGRESS NOTES
Discussed test results with patient  States she is starting to feel better  Will follow up with her family doctor  Vice to avoid any contact sports or high-intensity training

## 2017-10-28 VITALS
OXYGEN SATURATION: 98 % | HEIGHT: 59 IN | BODY MASS INDEX: 28.22 KG/M2 | WEIGHT: 139.99 LBS | SYSTOLIC BLOOD PRESSURE: 118 MMHG | HEART RATE: 93 BPM | TEMPERATURE: 98.1 F | DIASTOLIC BLOOD PRESSURE: 58 MMHG | RESPIRATION RATE: 18 BRPM

## 2017-10-28 PROCEDURE — 96374 THER/PROPH/DIAG INJ IV PUSH: CPT

## 2017-10-28 PROCEDURE — 99284 EMERGENCY DEPT VISIT MOD MDM: CPT

## 2017-10-28 RX ORDER — OXYCODONE HYDROCHLORIDE AND ACETAMINOPHEN 5; 325 MG/1; MG/1
1 TABLET ORAL ONCE
Status: COMPLETED | OUTPATIENT
Start: 2017-10-28 | End: 2017-10-28

## 2017-10-28 RX ORDER — OXYCODONE HYDROCHLORIDE AND ACETAMINOPHEN 5; 325 MG/1; MG/1
1 TABLET ORAL ONCE
Status: DISCONTINUED | OUTPATIENT
Start: 2017-10-28 | End: 2017-10-28 | Stop reason: HOSPADM

## 2017-10-28 RX ORDER — OXYCODONE HYDROCHLORIDE AND ACETAMINOPHEN 5; 325 MG/1; MG/1
1 TABLET ORAL EVERY 8 HOURS PRN
Qty: 12 TABLET | Refills: 0 | Status: SHIPPED | OUTPATIENT
Start: 2017-10-28 | End: 2018-03-13

## 2017-10-28 RX ORDER — KETOROLAC TROMETHAMINE 30 MG/ML
15 INJECTION, SOLUTION INTRAMUSCULAR; INTRAVENOUS ONCE
Status: COMPLETED | OUTPATIENT
Start: 2017-10-28 | End: 2017-10-28

## 2017-10-28 RX ADMIN — KETOROLAC TROMETHAMINE 15 MG: 30 INJECTION, SOLUTION INTRAMUSCULAR at 01:09

## 2017-10-28 RX ADMIN — OXYCODONE HYDROCHLORIDE AND ACETAMINOPHEN 1 TABLET: 5; 325 TABLET ORAL at 01:07

## 2017-10-28 RX ADMIN — IOHEXOL 100 ML: 350 INJECTION, SOLUTION INTRAVENOUS at 00:20

## 2017-10-28 NOTE — DISCHARGE INSTRUCTIONS
Abdominal Pain, Ambulatory Care   GENERAL INFORMATION:   Abdominal pain  can be dull, achy, or sharp  You may have pain in one area of your abdomen, or in your entire abdomen  Your pain may be caused by constipation, food sensitivity or poisoning, infection, or a blockage  Abdominal pain can also be caused by a hernia, appendicitis, or an ulcer  The cause of your abdominal pain may be unknown  Seek immediate care for the following symptoms:   · New chest pain or shortness of breath    · Pulsing pain in your upper abdomen or lower back that suddenly becomes constant    · Pain in the right lower abdominal area that worsens with movement    · Fever over 100 4°F (38°C) or shaking chills    · Vomiting and you cannot keep food or fluids down    · Pain does not improve or gets worse over the next 8 to 12 hours    · Blood in your vomit or bowel movements, or they look black and tarry    · Skin or the whites of your eyes turn yellow    · Large amount of vaginal bleeding that is not your monthly period  Treatment for abdominal pain  may include medicine to calm your stomach, prevent vomiting, or decrease pain  Follow up with your healthcare provider as directed:  Write down your questions so you remember to ask them during your visits  CARE AGREEMENT:   You have the right to help plan your care  Learn about your health condition and how it may be treated  Discuss treatment options with your caregivers to decide what care you want to receive  You always have the right to refuse treatment  The above information is an  only  It is not intended as medical advice for individual conditions or treatments  Talk to your doctor, nurse or pharmacist before following any medical regimen to see if it is safe and effective for you  © 2014 [address] is for End User's use only and may not be sold, redistributed or otherwise used for commercial purposes   All illustrations and images included in [facility] are the copyrighted property of [employer]  or [employer]  Mononucleosis   WHAT YOU NEED TO KNOW:   What is mononucleosis (mono)? Mono is an infection caused by a virus  Mono is spread through saliva  What are the signs and symptoms of mono? · Extreme tiredness or weakness     · Sore throat or swollen tonsils    · Fever    · Tender, swollen lymph nodes on the sides and back of your neck    · Headache and muscle aches    · Night sweats    · Loss of appetite  How is mono diagnosed? Your healthcare provider will ask about your symptoms and examine you  You may need any of the following:  · A blood test  may show signs of infection or the virus that causes mono  · A throat swab  may be needed to check for infection  A healthcare provider will rub a cotton swab against the back of your throat  · An ultrasound or CT scan  may show inflammation or damage to your spleen or appendix  You may be given contrast liquid before the CT scan  Tell the healthcare provider if you have ever had an allergic reaction to contrast liquid  How is mono treated? Your symptoms may last for 4 weeks or longer  You may need any of the following:  · Acetaminophen  decreases pain and fever  It is available without a doctor's order  Ask how much to take and how often to take it  Follow directions  Acetaminophen can cause liver damage if not taken correctly  · NSAIDs , such as ibuprofen, help decrease swelling, pain, and fever  This medicine is available with or without a doctor's order  NSAIDs can cause stomach bleeding or kidney problems in certain people  If you take blood thinner medicine, always ask your healthcare provider if NSAIDs are safe for you  Always read the medicine label and follow directions  · Steroids  help decrease inflammation  · Antibiotics  may be needed if you also have a bacterial infection  How can I manage my symptoms? · Rest as needed    Slowly start to do more each day as you feel better  · Drink liquids as directed  Liquids will help prevent dehydration  Ask how much liquid to drink each day and which liquids are best for you  · Do not play sports or exercise for 3 to 4 weeks or as directed  When you return for your follow-up visit, your healthcare provider will tell you if you are able to return to full activity  How can I prevent the spread of mono? Do not share food or drinks  Do not kiss anyone  The virus may be in your saliva for several months after you feel better  Wash your hands often  Use soap and water  Wash your hands after you use the bathroom, change a child's diapers, or sneeze  Wash your hands before you prepare or eat food  Call 911 for any of the following:   · You have shortness of breath  · You are confused or have a seizure  When should I seek immediate care? · You have severe pain in your abdomen or shoulder  · You have trouble swallowing because of the pain  · You urinate very little or not at all  · Your arms or legs are weak  When should I contact my healthcare provider? · Your symptoms get worse, even after treatment  · You have questions or concerns about your condition or care  CARE AGREEMENT:   You have the right to help plan your care  Learn about your health condition and how it may be treated  Discuss treatment options with your caregivers to decide what care you want to receive  You always have the right to refuse treatment  The above information is an  only  It is not intended as medical advice for individual conditions or treatments  Talk to your doctor, nurse or pharmacist before following any medical regimen to see if it is safe and effective for you  © 2017 [address] Information is for End User's use only and may not be sold, redistributed or otherwise used for commercial purposes   All illustrations and images included in CareNotes® are the copyrighted property of [employer] M [employer]  or [employer] Davis

## 2017-10-28 NOTE — ED NOTES
Dropped 1 tablet of percocet on floor, wasted that tablet and removed new tablet from accudose for administration      Jasmeet He RN  10/28/17 8774

## 2017-10-28 NOTE — ED PROVIDER NOTES
History  Chief Complaint   Patient presents with    Abdominal Pain     pt had +mono test taken 2 nights ago  unsure of last full BM, pain intermittent abdomen for 1 week associated with nausea and vomiting (which resolved today)  started monday with ear pain, throat pain, and cough today  History provided by:  Patient   used: No    Abdominal Pain   Associated symptoms: sore throat    Associated symptoms: no chest pain, no cough, no diarrhea, no nausea, no shortness of breath and no vomiting     52-year-old female presented for evaluation of intermittent abdominal pain, mostly left-sided over the past few days  She was recently diagnosed with mononucleosis  Has had sore throat, fever  Denies any difficulty breathing, swallowing, speaking  She reports mild left-sided abdominal pain at the moment  Has some mild tenderness on exam   She is afebrile here  Will plan CT abdomen to rule out splenic injury  She denies any trauma  Otherwise symptoms consistent with mono infection  Prior to Admission Medications   Prescriptions Last Dose Informant Patient Reported? Taking?   ferrous sulfate 325 (65 Fe) mg tablet Past Week at Unknown time  Yes Yes   Sig: Take 325 mg by mouth daily with breakfast   ondansetron (ZOFRAN) 4 mg tablet Unknown at Unknown time  No No   Sig: Take 1 tablet by mouth every 8 (eight) hours as needed for nausea or vomiting for up to 3 days      Facility-Administered Medications: None       Past Medical History:   Diagnosis Date    Anemia        Past Surgical History:   Procedure Laterality Date    APPENDECTOMY       SECTION      x2       History reviewed  No pertinent family history  I have reviewed and agree with the history as documented  Social History   Substance Use Topics    Smoking status: Never Smoker    Smokeless tobacco: Never Used    Alcohol use No        Review of Systems   Constitutional: Negative for activity change and appetite change  HENT: Positive for sore throat  Negative for trouble swallowing and voice change  Respiratory: Negative for cough, chest tightness and shortness of breath  Cardiovascular: Negative for chest pain and palpitations  Gastrointestinal: Positive for abdominal pain  Negative for diarrhea, nausea and vomiting  Musculoskeletal: Negative for back pain and neck pain  Neurological: Negative for dizziness and headaches  All other systems reviewed and are negative  Physical Exam  ED Triage Vitals [10/27/17 2208]   Temperature Pulse Respirations Blood Pressure SpO2   98 1 °F (36 7 °C) 103 18 130/69 97 %      Temp Source Heart Rate Source Patient Position - Orthostatic VS BP Location FiO2 (%)   Oral Monitor Sitting Right arm --      Pain Score       9           Orthostatic Vital Signs  Vitals:    10/27/17 2208 10/28/17 0044   BP: 130/69 118/58   Pulse: 103 93   Patient Position - Orthostatic VS: Sitting Lying       Physical Exam   Constitutional: She is oriented to person, place, and time  She appears well-developed and well-nourished  No distress  HENT:   Head: Normocephalic  Mouth/Throat: Oropharynx is clear and moist    Eyes: EOM are normal  Pupils are equal, round, and reactive to light  Neck: Normal range of motion  Neck supple  Cardiovascular: Normal rate, regular rhythm and intact distal pulses  Pulmonary/Chest: Effort normal and breath sounds normal    Abdominal: Soft  She exhibits no distension and no mass  There is no rebound and no guarding  Mild tenderness of the left abdomen  Musculoskeletal: Normal range of motion  She exhibits no edema  Lymphadenopathy:     She has cervical adenopathy  Neurological: She is alert and oriented to person, place, and time  She exhibits normal muscle tone  Coordination normal    Skin: Skin is warm and dry  Psychiatric: She has a normal mood and affect  Her behavior is normal    Nursing note and vitals reviewed        ED Medications  Medications ketorolac (TORADOL) 30 mg/mL injection 15 mg (not administered)   oxyCODONE-acetaminophen (PERCOCET) 5-325 mg per tablet 1 tablet (not administered)   oxyCODONE-acetaminophen (PERCOCET) 5-325 mg per tablet 1 tablet (not administered)   sodium chloride 0 9 % bolus 1,000 mL (0 mL Intravenous Stopped 10/28/17 0044)   iohexol (OMNIPAQUE) 350 MG/ML injection (SINGLE-DOSE) 100 mL (100 mL Intravenous Given 10/28/17 0020)       Diagnostic Studies  Results Reviewed     Procedure Component Value Units Date/Time    CBC and differential [[id number]]  (Abnormal) Collected:  10/27/17 2232    Lab Status:  Final result Specimen:  Blood from Arm, Right Updated:  10/27/17 2309     WBC 5 05 Thousand/uL      RBC 4 09 Million/uL      Hemoglobin 11 1 (L) g/dL      Hematocrit 32 8 (L) %      MCV 80 (L) fL      MCH 27 1 pg      MCHC 33 8 g/dL      RDW 13 9 %      MPV 9 3 fL      Platelets 555 Thousands/uL     Narrative: This is an appended report  These results have been appended to a previously verified report  Comprehensive metabolic panel [[id number]]  (Abnormal) Collected:  10/27/17 2232    Lab Status:  Final result Specimen:  Blood from Arm, Right Updated:  10/27/17 2304     Sodium 140 mmol/L      Potassium 3 5 mmol/L      Chloride 104 mmol/L      CO2 27 mmol/L      Anion Gap 9 mmol/L      BUN 8 mg/dL      Creatinine 0 84 mg/dL      Glucose 99 mg/dL      Calcium 8 7 mg/dL      AST 54 (H) U/L       (H) U/L      Alkaline Phosphatase 89 U/L      Total Protein 7 4 g/dL      Albumin 3 6 g/dL      Total Bilirubin 0 70 mg/dL      eGFR 96 ml/min/1 73sq m     Narrative:         National Kidney Disease Education Program recommendations are as follows:  GFR calculation is accurate only with a steady state creatinine  Chronic Kidney disease less than 60 ml/min/1 73 sq  meters  Kidney failure less than 15 ml/min/1 73 sq  meters      Lipase [[id number]]  (Normal) Collected:  10/27/17 2232    Lab Status:  Final result Specimen:  Blood from Arm, Right Updated:  10/27/17 2259     Lipase 157 u/L     POCT urinalysis dipstick [[id number]]  (Abnormal) Resulted:  10/27/17 2244    Lab Status:  Final result Specimen:  Urine Updated:  10/27/17 2244     Color, UA yellow     Clarity, UA clear     EXT Glucose, UA negative     EXT Bilirubin, UA (Ref: Negative) negative     EXT Ketones, UA (Ref: Negative) negative     EXT Spec Grav, UA 1 010     EXT Blood, UA (Ref: Negative) large     EXT pH, UA 7 0     EXT Protein, UA (Ref: Negative) trace     EXT Urobilinogen, UA (Ref: 0 2- 1 0) negative     EXT Leukocytes, UA (Ref: Negative) trace     EXT Nitrite, UA (Ref: Negative) negative    POCT pregnancy, urine [[id number]]  (Normal) Resulted:  10/27/17 2243    Lab Status:  Final result Updated:  10/27/17 2244     EXT PREG TEST UR (Ref: Negative) negative                 CT abdomen pelvis with contrast   ED Interpretation by Jennifer Salazar MD (10/28 0050)   Spleen measures 15 cm  History of mononucleosis  Probably physiologic small amount of free fluid in the pelvis                   Procedures  Procedures       Phone Contacts  ED Phone Contact    ED Course  ED Course                                MDM  Number of Diagnoses or Management Options  Abdominal pain:   Splenomegaly:   Diagnosis management comments: 60-year-old female with a recent diagnosis of mono presented with left-sided abdominal pain  Mildly tender on exam   CT remarkable for splenomegaly without rupture  Pain controlled in the ED  Will have follow-up  Advised strongly to avoid exercising, sports, physical contact         Amount and/or Complexity of Data Reviewed  Clinical lab tests: ordered and reviewed  Tests in the radiology section of CPT®: ordered and reviewed    Patient Progress  Patient progress: improved    CritCare Time    Disposition  Final diagnoses:   Splenomegaly   Abdominal pain     Time reflects when diagnosis was documented in both MDM as applicable and the Disposition within this note     Time User Action Codes Description Comment    10/28/2017 12:58 AM Gopal Guerrero Add [R16 1] Splenomegaly     10/28/2017 12:58 AM Emiliana Alicea Add [R10 9] Abdominal pain       ED Disposition     ED Disposition Condition Comment    Discharge  Xochitl Chun discharge to home/self care  Condition at discharge: Stable        Follow-up Information     Follow up With Specialties Details Why Contact Info Additional [address] [facility], [address] [facility]   [id number] [place] [address] [address]  [facility] [address] [facility] Emergency Department Emergency Medicine  If symptoms worsen [address] [address]  [phone] [facility] ED, [address], [place], [place], [address]        Patient's Medications   Discharge Prescriptions    OXYCODONE-ACETAMINOPHEN (PERCOCET) 5-325 MG PER TABLET    Take 1 tablet by mouth every 8 (eight) hours as needed for moderate pain Max Daily Amount: 3 tablets       Start Date: 10/28/2017End Date: --       Order Dose: 1 tablet       Quantity: 12 tablet    Refills: 0     No discharge procedures on file      ED Provider  Electronically Signed by           Lizzette Rhoades MD  10/28/17 9883

## 2017-10-30 ENCOUNTER — GENERIC CONVERSION - ENCOUNTER (OUTPATIENT)
Dept: OTHER | Facility: OTHER | Age: 27
End: 2017-10-30

## 2017-10-30 ENCOUNTER — ALLSCRIPTS OFFICE VISIT (OUTPATIENT)
Dept: OTHER | Facility: OTHER | Age: 27
End: 2017-10-30

## 2017-10-30 DIAGNOSIS — B27.90 INFECTIOUS MONONUCLEOSIS WITHOUT COMPLICATION: ICD-10-CM

## 2017-11-01 ENCOUNTER — LAB CONVERSION - ENCOUNTER (OUTPATIENT)
Dept: OTHER | Facility: OTHER | Age: 27
End: 2017-11-01

## 2017-11-01 ENCOUNTER — ALLSCRIPTS OFFICE VISIT (OUTPATIENT)
Dept: OTHER | Facility: OTHER | Age: 27
End: 2017-11-01

## 2017-11-01 ENCOUNTER — HOSPITAL ENCOUNTER (EMERGENCY)
Facility: HOSPITAL | Age: 27
Discharge: HOME | End: 2017-11-01
Attending: EMERGENCY MEDICINE
Payer: COMMERCIAL

## 2017-11-01 VITALS
DIASTOLIC BLOOD PRESSURE: 75 MMHG | OXYGEN SATURATION: 98 % | SYSTOLIC BLOOD PRESSURE: 121 MMHG | TEMPERATURE: 97.8 F | WEIGHT: 143 LBS | HEART RATE: 102 BPM | RESPIRATION RATE: 20 BRPM | BODY MASS INDEX: 28.88 KG/M2

## 2017-11-01 DIAGNOSIS — H92.02 OTALGIA OF LEFT EAR: Primary | ICD-10-CM

## 2017-11-01 LAB
A/G RATIO (HISTORICAL): 1.2 (CALC) (ref 1–2.5)
ALBUMIN SERPL BCP-MCNC: 3.7 G/DL (ref 3.6–5.1)
ALP SERPL-CCNC: 71 U/L (ref 33–115)
ALT SERPL W P-5'-P-CCNC: 87 U/L (ref 6–29)
AST SERPL W P-5'-P-CCNC: 47 U/L (ref 10–30)
BACTERIA THROAT CULT: ABNORMAL
BACTERIA THROAT CULT: ABNORMAL
BILIRUB SERPL-MCNC: 0.8 MG/DL (ref 0.2–1.2)
BUN SERPL-MCNC: 9 MG/DL (ref 7–25)
BUN/CREA RATIO (HISTORICAL): ABNORMAL (CALC) (ref 6–22)
CALCIUM SERPL-MCNC: 8.9 MG/DL (ref 8.6–10.2)
CHLORIDE SERPL-SCNC: 106 MMOL/L (ref 98–110)
CO2 SERPL-SCNC: 28 MMOL/L (ref 20–31)
CREAT SERPL-MCNC: 0.71 MG/DL (ref 0.5–1.1)
DEPRECATED RDW RBC AUTO: 14.1 % (ref 11–15)
EGFR AFRICAN AMERICAN (HISTORICAL): 136 ML/MIN/1.73M2
EGFR-AMERICAN CALC (HISTORICAL): 118 ML/MIN/1.73M2
GAMMA GLOBULIN (HISTORICAL): 3 G/DL (CALC) (ref 1.9–3.7)
GLUCOSE (HISTORICAL): 97 MG/DL (ref 65–99)
HCT VFR BLD AUTO: 32.2 % (ref 35–45)
HGB BLD-MCNC: 10.7 G/DL (ref 11.7–15.5)
MCH RBC QN AUTO: 27.3 PG (ref 27–33)
MCHC RBC AUTO-ENTMCNC: 33.2 G/DL (ref 32–36)
MCV RBC AUTO: 82.1 FL (ref 80–100)
PLATELET # BLD AUTO: 184 THOUSAND/UL (ref 140–400)
PMV BLD AUTO: 10 FL (ref 7.5–12.5)
POTASSIUM SERPL-SCNC: 4.1 MMOL/L (ref 3.5–5.3)
RBC # BLD AUTO: 3.92 MILLION/UL (ref 3.8–5.1)
SODIUM SERPL-SCNC: 141 MMOL/L (ref 135–146)
TOTAL PROTEIN (HISTORICAL): 6.7 G/DL (ref 6.1–8.1)
WBC # BLD AUTO: 5.6 THOUSAND/UL (ref 3.8–10.8)

## 2017-11-01 PROCEDURE — 99283 EMERGENCY DEPT VISIT LOW MDM: CPT

## 2017-11-01 RX ORDER — IBUPROFEN 600 MG/1
600 TABLET ORAL ONCE
Status: COMPLETED | OUTPATIENT
Start: 2017-11-01 | End: 2017-11-01

## 2017-11-01 RX ADMIN — IBUPROFEN 600 MG: 600 TABLET ORAL at 04:52

## 2017-11-01 NOTE — ED NOTES
Pt discharge instructions reviewed, Pt has no further questions at this time  Pt awake and alert, no signs of acute distress noted  Pt ambulated out of ED with a steady gait       Edyta Moon RN  11/01/17 8482

## 2017-11-01 NOTE — ED PROVIDER NOTES
History  Chief Complaint   Patient presents with    Earache     Pt c/o left ear pain since 22:00, recently diagnosed with Mono, given percocet  Reports her spleen is enlarged and her liver is inflammed, she is allergic amoxicillin so has not been started on ABX  Pt in ER with c/o soreness in her throat and left earpain that began when she attempted to blow her nose  Pt was recently diagnosed with mono and strep C  She is currently on percocet for pain relief, pending abx recommendation from PCP, due to allergy to amox  She denies fevers/chills  Prior to Admission Medications   Prescriptions Last Dose Informant Patient Reported? Taking?   ondansetron (ZOFRAN) 4 mg tablet   No Yes   Sig: Take 1 tablet by mouth every 8 (eight) hours as needed for nausea or vomiting for up to 3 days   oxyCODONE-acetaminophen (PERCOCET) 5-325 mg per tablet   No Yes   Sig: Take 1 tablet by mouth every 8 (eight) hours as needed for moderate pain Max Daily Amount: 3 tablets      Facility-Administered Medications: None       Past Medical History:   Diagnosis Date    Anemia        Past Surgical History:   Procedure Laterality Date    APPENDECTOMY       SECTION      x2       History reviewed  No pertinent family history  I have reviewed and agree with the history as documented  Social History   Substance Use Topics    Smoking status: Never Smoker    Smokeless tobacco: Never Used    Alcohol use No        Review of Systems   Constitutional: Negative for chills  HENT: Positive for ear pain, sore throat and trouble swallowing  Negative for ear discharge and facial swelling  Gastrointestinal: Negative for abdominal pain, nausea and vomiting  All other systems reviewed and are negative        Physical Exam  ED Triage Vitals   Temperature Pulse Respirations Blood Pressure SpO2   17 0405 17 0405 17 0405 17 0405 17 0405   97 8 °F (36 6 °C) 102 20 121/75 100 %      Temp Source Heart Rate Source Patient Position - Orthostatic VS BP Location FiO2 (%)   11/01/17 0405 11/01/17 0405 11/01/17 0405 11/01/17 0405 --   Oral Monitor Lying Right arm       Pain Score       11/01/17 0410       Worst Possible Pain           Orthostatic Vital Signs  Vitals:    11/01/17 0405 11/01/17 0415 11/01/17 0430   BP: 121/75 121/75    Pulse: 102 100 100   Patient Position - Orthostatic VS: Lying         Physical Exam   Constitutional: She appears well-developed and well-nourished  HENT:   Head: Normocephalic and atraumatic  Right Ear: Tympanic membrane, external ear and ear canal normal    Left Ear: Tympanic membrane, external ear and ear canal normal  Tympanic membrane is not injected, not perforated and not erythematous  No hemotympanum  Nose: Nose normal    Mouth/Throat: Oropharynx is clear and moist  No oropharyngeal exudate  Eyes: Conjunctivae and EOM are normal  Pupils are equal, round, and reactive to light  Neck: Normal range of motion  Neck supple  Cardiovascular: Normal rate and regular rhythm  Nursing note and vitals reviewed  ED Medications  Medications   ibuprofen (MOTRIN) tablet 600 mg (600 mg Oral Given 11/1/17 0452)       Diagnostic Studies  Results Reviewed     None                 No orders to display              Procedures  Procedures       Phone Contacts  ED Phone Contact    ED Course  ED Course                                MDM  Number of Diagnoses or Management Options  Otalgia of left ear: new and does not require workup  Diagnosis management comments: Pt with ear pain from blwoing her nose  Recommended an OTC decongestant and outpt f/u  Will also give info for ENT       CritCare Time    Disposition  Final diagnoses:   Otalgia of left ear     Time reflects when diagnosis was documented in both MDM as applicable and the Disposition within this note     Time User Action Codes Description Comment    11/1/2017  4:52 AM Dimas Kline Add [H92 02] Otalgia of left ear ED Disposition     ED Disposition Condition Comment    Discharge  Xochitl Chun discharge to home/self care  Condition at discharge: Good        Follow-up Information     Follow up With Specialties Details Why [address], [address] Call in 1 day  [address]  [phone]      Checo Harley MD Otolaryngology Call in 1 day  [address] [facility] [address] [facility]   [phone]          Patient's Medications   Discharge Prescriptions    No medications on file     No discharge procedures on file      ED Provider  Electronically Signed by           Nyla Laboy DO  11/01/17 5055

## 2017-11-01 NOTE — DISCHARGE INSTRUCTIONS
Earache   WHAT YOU NEED TO KNOW:   An earache can be caused by a problem within your ear or from another body area  Common causes include earwax buildup, objects in your ear, injury, infections, or jaw or dental problems  Less often, earaches may be caused by arthritis in your upper spine  DISCHARGE INSTRUCTIONS:   Return to the emergency department if:   · You have a severe earache  · You have ear pain with itching, hearing loss, dizziness, a feeling of fullness in your ear, or ringing in your ears  Contact your healthcare provider if:   · Your ear pain worsens or does not go away with treatment  · You have drainage from your ear  · You have a fever  · Your outer ear becomes red, swollen, and warm  · You have questions or concerns about your condition or care  Medicines: You may need any of the following:  · NSAIDs , such as ibuprofen, help decrease swelling, pain, and fever  This medicine is available with or without a doctor's order  NSAIDs can cause stomach bleeding or kidney problems in certain people  If you take blood thinner medicine, always ask if NSAIDs are safe for you  Always read the medicine label and follow directions  Do not give these medicines to children under 10months of age without direction from your child's healthcare provider  · Acetaminophen  decreases pain and fever  It is available without a doctor's order  Ask how much to take and how often to take it  Follow directions  Acetaminophen can cause liver damage if not taken correctly  · Do not give aspirin to children under 25years of age  Your child could develop Reye syndrome if he takes aspirin  Reye syndrome can cause life-threatening brain and liver damage  Check your child's medicine labels for aspirin, salicylates, or oil of wintergreen  · Take your medicine as directed  Call your healthcare provider if you think your medicine is not helping or if you have side effects   Tell him if you are allergic to any medicine  Keep a list of the medicines, vitamins, and herbs you take  Include the amounts, and when and why you take them  Bring the list or the pill bottles to follow-up visits  Carry your medicine list with you in case of an emergency  Follow up with your healthcare provider as directed:  Write down your questions so you remember to ask them during your visits  © 2017 [address] [employer] Information is for End User's use only and may not be sold, redistributed or otherwise used for commercial purposes  All illustrations and images included in CareNotes® are the copyrighted property of [employer] , [employer]  or Anthony Davis  The above information is an  only  It is not intended as medical advice for individual conditions or treatments  Talk to your doctor, nurse or pharmacist before following any medical regimen to see if it is safe and effective for you

## 2017-11-02 ENCOUNTER — GENERIC CONVERSION - ENCOUNTER (OUTPATIENT)
Dept: OTHER | Facility: OTHER | Age: 27
End: 2017-11-02

## 2017-11-03 NOTE — PROGRESS NOTES
Assessment  1  Acute streptococcal pharyngitis (034 0) (J02 0)   2  Acute serous otitis media of left ear, recurrence not specified (381 01) (H65 02)   3  Infectious mononucleosis (075) (B27 90)    Plan   Acute streptococcal pharyngitis    · Avoid exposure to cigarette smoke ; Status:Complete;   Done: 17PBZ0634   · Drink at least 6 glasses of water or juice a day ; Status:Complete;   Done: 55YOK9198   · Rest in bed until your temperature returns to normal ; Status:Complete;   Done:  80POI1636   · The following home treatments may soothe a sore throat ; Status:Complete;   Done:  58PDO0497   · Call [phone] if: The fever comes back after being normal for 2 days ;  Status:Complete;   Done: 52ZEE9328  Streptococcus infection, group C    · Azithromycin 250 MG Oral Tablet; TAKE 2 TABLETS ON DAY 1 THEN TAKE 1  TABLET A DAY FOR 4 DAYS    Ciprodex 0 3-0 1 % Otic Suspension; INSTILL 3 DROPS IN AFFECTED EAR(S) TWICE DAILY; Therapy: 77HTX4965 to (Complete:08Nov2017)  Requested for: 29JUK5228; Last Rx:01Nov2017; Status: ACTIVE Ordered  Rx By: Deisi Shannon; Dispense: 7 Days ; #:1 X 7 5 ML Bottle; Refill: 0;   For: Acute serous otitis media of left ear, recurrence not specified; FELICIA = N; Verified Transmission to [address] [place] [address]; Last Updated By: [email or web address]; 11/2/2017 9:53:07 AM  Streptococcus infection, group C (041 03) (A49 1)          Discussion/Summary    Pt started on Zpack and topical antibiotics for left ear  Will fup closely if symptoms persist/ worsen  The patient was counseled regarding diagnostic results,-- instructions for management,-- prognosis,-- risks and benefits of treatment options  Possible side effects of new medications were reviewed with the patient/guardian today  The treatment plan was reviewed with the patient/guardian   The patient/guardian understands and agrees with the treatment plan      Chief Complaint  Bilateral ear discomfort and drainage      History of Present Illness  Ear Pain:   [place] presents with complaints of sudden onset of constant episodes of moderate left ear pain, described as sharp, non-radiating  Episodes started 1 day ago  Her symptoms are caused by no known event  Symptoms are made worse by pulling on ear  Symptoms are worsening  Previous Evaluation: ER- positive for Strep C, Infectious mononucleosis      Associated symptoms include ear pressure,-- ear drainage,-- decreased hearing,-- auricular pain,-- sore throat-- and-- swollen glands, but-- no fever,-- no nasal congestion,-- no cough,-- no vertigo,-- no loss of balance,-- no nausea-- and-- no temporomandibular joint pain  Review of Systems    Constitutional: as noted in HPI    ENT: as noted in HPI  Cardiovascular: no complaints of slow or fast heart rate, no chest pain, no palpitations, no leg claudication or lower extremity edema  Respiratory: as noted in HPI  Active Problems   1  Abnormal CT scan, head (793 0) (R93 0)   2  Acute upper respiratory infection (465 9) (J06 9)   3  Encounter for gynecological examination (V72 31) (Z01 419)   4  Migraine (346 90) (G43 909)   5  Previous  section complicating pregnancy, antepartum condition or   complication (620 45) (U06 120)   6  Vaginal yeast infection (112 1) (B37 3)   7  Viral gastroenteritis (008 8) (A08 4)   8  Vision changes (368 9) (H53 9)    Infectious mononucleosis (075) (B27 90)       Streptococcus infection, group C (041 03) (A49 1)          Past Medical History  1  History of 1st trimester screening (V28 89) (Z36 9)   2  History of Acute reaction to stress (308 9) (F43 0)   3  History of Acute upper respiratory infection (465 9) (J06 9)   4  History of Acute upper respiratory infection (465 9) (J06 9)   5  History of Cough (786 2) (R05)   6  History of acute bronchitis (V12 69) (Z87 09)   7  History of acute pharyngitis (V12 69) (Z87 09)   8  History of acute sinusitis (V12 69) (Z87 09)   9   History of allergic rhinitis (V12 69) (Z87 09)   10  History of gestational diabetes (V12 21) (Z86 32)   11  History of gestational diabetes (V12 21) (Z86 32)   12  History of paronychia of finger (V13 3) (Z87 2)   13  History of viral gastroenteritis (V12 09) (Z86 19)   14  History of Hyperemesis gravidarum, antepartum (643 03) (O21 0)   15  History of Impacted cerumen, unspecified laterality (380 4) (H61 20)   16  History of Lump in neck (784 2) (R22 1)   17  History of Palpitations (785 1) (R00 2)   18  History of Vaginal yeast infection (112 1) (B37 3)   19  History of Well adult on routine health check (V70 0) (Z00 00)  Active Problems And Past Medical History Reviewed: The active problems and past medical history were reviewed and updated today  Family History  Mother    1  Family history of Diabetes (250 00) (E11 9)   2  Family history of depression (V17 0) (Z81 8)  Family History    3  Family history of Depression (311) (F32 9)    Social History   · Former smoker (G44 27) (I52 116)   · No alcohol use  The social history was reviewed and updated today  Surgical History  1  History of Appendectomy   2  History of Appendectomy   3  History of  Section Low Transverse   4  History of  Section Low Transverse   5  History of Tubal Ligation    Current Meds   1  Iron 240 (27 Fe) MG Oral Tablet; Therapy: 87YNH2756 to Recorded    The medication list was reviewed and updated today  Allergies  1  Amoxicillin TABS   2  Vicodin TABS   3  ZyrTEC Allergy TABS   4  AMOXICILLIN   5  Amoxicillin TABS   6  Vicodin TABS   7  Zyrtec TABS  8  Other    Vitals   Recorded: 24WOA4231 04:21PM   Temperature 97 F   Heart Rate 114   Respiration 18   Systolic 643   Diastolic 62   Height 4 ft 11 in   Weight 139 lb    BMI Calculated 28 07   BSA Calculated 1 58   O2 Saturation 98     Physical Exam    Constitutional   General appearance: No acute distress, well appearing and well nourished      Ears, Nose, Mouth, and Throat Otoscopic examination: Abnormal   The right tympanic membrane was normal  The left tympanic membrane had a loss of landmarks-- and-- had a diminished light reflex  TM has multiple bubbles, Perforation not identified, since pt is tender to touch  The left external canal was tender  Exam of the left middle ear showed a middle ear effusion  Oropharynx: Abnormal   The posterior pharynx was erythematous  There was enlargement and erythema of both tonsils  Pulmonary   Auscultation of lungs: Clear to auscultation  Cardiovascular   Auscultation of heart: Normal rate and rhythm, normal S1 and S2, without murmurs  Carotid pulses: Normal          Future Appointments    Date/Time Provider Specialty Site   11/07/2017 02:40 PM NARENDRA Guajardo   [address] [facility]     Signatures   Electronically signed by : Sergio Hayes MD; Nov 2 2017  9:59AM EST                       (Author)

## 2017-11-07 ENCOUNTER — ALLSCRIPTS OFFICE VISIT (OUTPATIENT)
Dept: OTHER | Facility: OTHER | Age: 27
End: 2017-11-07

## 2017-11-07 ENCOUNTER — LAB CONVERSION - ENCOUNTER (OUTPATIENT)
Dept: OTHER | Facility: OTHER | Age: 27
End: 2017-11-07

## 2017-11-07 ENCOUNTER — GENERIC CONVERSION - ENCOUNTER (OUTPATIENT)
Dept: OTHER | Facility: OTHER | Age: 27
End: 2017-11-07

## 2017-11-07 LAB
A/G RATIO (HISTORICAL): 1.4 (CALC) (ref 1–2.5)
ALBUMIN SERPL BCP-MCNC: 3.9 G/DL (ref 3.6–5.1)
ALP SERPL-CCNC: 71 U/L (ref 33–115)
ALT SERPL W P-5'-P-CCNC: 53 U/L (ref 6–29)
AST SERPL W P-5'-P-CCNC: 22 U/L (ref 10–30)
BASOPHILS # BLD AUTO: 0.8 %
BASOPHILS # BLD AUTO: 40 CELLS/UL (ref 0–200)
BILIRUB SERPL-MCNC: 0.6 MG/DL (ref 0.2–1.2)
BUN SERPL-MCNC: 11 MG/DL (ref 7–25)
BUN/CREA RATIO (HISTORICAL): ABNORMAL (CALC) (ref 6–22)
CALCIUM SERPL-MCNC: 9 MG/DL (ref 8.6–10.2)
CHLORIDE SERPL-SCNC: 103 MMOL/L (ref 98–110)
CO2 SERPL-SCNC: 27 MMOL/L (ref 20–31)
CREAT SERPL-MCNC: 0.77 MG/DL (ref 0.5–1.1)
DEPRECATED RDW RBC AUTO: 13.6 % (ref 11–15)
EGFR AFRICAN AMERICAN (HISTORICAL): 124 ML/MIN/1.73M2
EGFR-AMERICAN CALC (HISTORICAL): 107 ML/MIN/1.73M2
EOSINOPHIL # BLD AUTO: 170 CELLS/UL (ref 15–500)
EOSINOPHIL # BLD AUTO: 3.4 %
GAMMA GLOBULIN (HISTORICAL): 2.8 G/DL (CALC) (ref 1.9–3.7)
GLUCOSE (HISTORICAL): 72 MG/DL (ref 65–99)
HCT VFR BLD AUTO: 32.8 % (ref 35–45)
HGB BLD-MCNC: 10.8 G/DL (ref 11.7–15.5)
LYMPHOCYTES # BLD AUTO: 2315 CELLS/UL (ref 850–3900)
LYMPHOCYTES # BLD AUTO: 46.3 %
MCH RBC QN AUTO: 27.1 PG (ref 27–33)
MCHC RBC AUTO-ENTMCNC: 32.9 G/DL (ref 32–36)
MCV RBC AUTO: 82.4 FL (ref 80–100)
MONOCYTES # BLD AUTO: 370 CELLS/UL (ref 200–950)
MONOCYTES (HISTORICAL): 7.4 %
NEUTROPHILS # BLD AUTO: 2105 CELLS/UL (ref 1500–7800)
NEUTROPHILS # BLD AUTO: 42.1 %
PLATELET # BLD AUTO: 247 THOUSAND/UL (ref 140–400)
PMV BLD AUTO: 9.3 FL (ref 7.5–12.5)
POTASSIUM SERPL-SCNC: 3.8 MMOL/L (ref 3.5–5.3)
RBC # BLD AUTO: 3.98 MILLION/UL (ref 3.8–5.1)
SODIUM SERPL-SCNC: 136 MMOL/L (ref 135–146)
TOTAL PROTEIN (HISTORICAL): 6.7 G/DL (ref 6.1–8.1)
WBC # BLD AUTO: 5 THOUSAND/UL (ref 3.8–10.8)

## 2017-11-15 ENCOUNTER — ALLSCRIPTS OFFICE VISIT (OUTPATIENT)
Dept: OTHER | Facility: OTHER | Age: 27
End: 2017-11-15

## 2017-11-15 LAB
BILIRUB UR QL STRIP: NORMAL
CLARITY UR: NORMAL
COLOR UR: YELLOW
GLUCOSE (HISTORICAL): NORMAL
HGB UR QL STRIP.AUTO: NORMAL
KETONES UR STRIP-MCNC: NORMAL MG/DL
LEUKOCYTE ESTERASE UR QL STRIP: 75
NITRITE UR QL STRIP: NORMAL
PH UR STRIP.AUTO: 7 [PH]
PROT UR STRIP-MCNC: NORMAL MG/DL
SP GR UR STRIP.AUTO: 1.01
UROBILINOGEN UR QL STRIP.AUTO: NORMAL

## 2017-11-17 ENCOUNTER — LAB CONVERSION - ENCOUNTER (OUTPATIENT)
Dept: OTHER | Facility: OTHER | Age: 27
End: 2017-11-17

## 2017-11-17 LAB
BACTERIA UR QL AUTO: ABNORMAL /HPF
BILIRUB UR QL STRIP: NEGATIVE
COLOR UR: YELLOW
COMMENT (HISTORICAL): ABNORMAL
CULTURE RESULT (HISTORICAL): ABNORMAL
FECAL OCCULT BLOOD DIAGNOSTIC (HISTORICAL): NEGATIVE
GLUCOSE (HISTORICAL): NEGATIVE
HYALINE CASTS #/AREA URNS LPF: ABNORMAL /LPF
KETONES UR STRIP-MCNC: NEGATIVE MG/DL
LEUKOCYTE ESTERASE UR QL STRIP: ABNORMAL
NITRITE UR QL STRIP: NEGATIVE
PH UR STRIP.AUTO: 7.5 [PH] (ref 5–8)
PROT UR STRIP-MCNC: NEGATIVE MG/DL
RBC (HISTORICAL): ABNORMAL /HPF
SP GR UR STRIP.AUTO: 1.02 (ref 1–1.03)
SQUAMOUS EPITHELIAL CELLS (HISTORICAL): ABNORMAL /HPF
WBC # BLD AUTO: ABNORMAL /HPF

## 2017-11-20 ENCOUNTER — GENERIC CONVERSION - ENCOUNTER (OUTPATIENT)
Dept: OTHER | Facility: OTHER | Age: 27
End: 2017-11-20

## 2018-01-07 DIAGNOSIS — D50.9 IRON DEFICIENCY ANEMIA: ICD-10-CM

## 2018-01-08 ENCOUNTER — APPOINTMENT (OUTPATIENT)
Dept: LAB | Facility: CLINIC | Age: 28
End: 2018-01-08
Payer: COMMERCIAL

## 2018-01-08 DIAGNOSIS — D50.9 IRON DEFICIENCY ANEMIA: ICD-10-CM

## 2018-01-08 LAB
ERYTHROCYTE [DISTWIDTH] IN BLOOD BY AUTOMATED COUNT: 13.8 % (ref 11.6–15.1)
FERRITIN SERPL-MCNC: 23 NG/ML (ref 8–388)
HCT VFR BLD AUTO: 40.6 % (ref 34.8–46.1)
HGB BLD-MCNC: 13.1 G/DL (ref 11.5–15.4)
MCH RBC QN AUTO: 25.9 PG (ref 26.8–34.3)
MCHC RBC AUTO-ENTMCNC: 32.3 G/DL (ref 31.4–37.4)
MCV RBC AUTO: 80 FL (ref 82–98)
PLATELET # BLD AUTO: 250 THOUSANDS/UL (ref 149–390)
PMV BLD AUTO: 9.6 FL (ref 8.9–12.7)
RBC # BLD AUTO: 5.05 MILLION/UL (ref 3.81–5.12)
WBC # BLD AUTO: 5.61 THOUSAND/UL (ref 4.31–10.16)

## 2018-01-08 PROCEDURE — 82728 ASSAY OF FERRITIN: CPT

## 2018-01-08 PROCEDURE — 36415 COLL VENOUS BLD VENIPUNCTURE: CPT

## 2018-01-08 PROCEDURE — 85027 COMPLETE CBC AUTOMATED: CPT

## 2018-01-09 ENCOUNTER — GENERIC CONVERSION - ENCOUNTER (OUTPATIENT)
Dept: OTHER | Facility: OTHER | Age: 28
End: 2018-01-09

## 2018-01-10 NOTE — RESULT NOTES
Verified Results  * MRI BRAIN [facility] CONTRAST [id number] 02:54PM Delmis Tadeo Order Number: [id number]    - Patient Instructions: To schedule this appointment, please contact Central Scheduling at [phone]  Test Name Result Flag Reference   MRI BRAIN W WO CONTRAST (Report)     This is a summary report  The complete report is available in the patient's medical record  If you cannot access the medical record, please contact the sending organization for a detailed fax or copy  MRI BRAIN WITH AND WITHOUT CONTRAST     INDICATION: Headache with visual disturbance  Nausea  COMPARISON: CT brain dated 1/13/2017     TECHNIQUE:   Sagittal T1, axial T2, axial FLAIR, axial T1, axial Mercedes, axial diffusion  Sagittal, axial and coronal T1 postcontrast  Axial BRAVO post contrast     IV Contrast: gadobutrol injection (MULTI-DOSE) SOLN 5 mL Note: (SINGLE DOSE/MULTI DOSE) information refers to the container from which the contrast was acquired  Contrast was injected one time intravenously without immediate complication  IMAGE QUALITY:  Diagnostic  FINDINGS:     BRAIN PARENCHYMA: There is no discrete mass, mass effect or midline shift  No abnormal white matter signal identified  Incidentally noted focal dilated perivascular space within the left lentiform nucleus  Brainstem and cerebellum demonstrate normal    signal  There is no intracranial hemorrhage  There is no evidence of acute infarction and diffusion imaging is unremarkable  Postcontrast imaging of the brain demonstrates no abnormal enhancement  VENTRICLES: Normal      SELLA AND PITUITARY GLAND: Normal      ORBITS: Normal      PARANASAL SINUSES: Normal      VASCULATURE: Evaluation of the major intracranial vasculature demonstrates appropriate flow voids  CALVARIUM AND SKULL BASE: Normal      EXTRACRANIAL SOFT TISSUES: Normal        IMPRESSION:     Normal MRI of the brain         Workstation performed: [id number]     Signed Aga Cooper, DO   2/20/17

## 2018-01-11 NOTE — MISCELLANEOUS
Message  Return to work or school:   Albina Alvarado is under my professional care  She was seen in my office on 11/07/2017       PT CAN RETURN BACK TO WORK HER MONO IS GONE 11/07/2017     DR Kareem Quezada MD/ [facility]       Signatures   Electronically signed by : Namrata Paz, ; Nov 7 2017  3:26PM EST                       (Author)

## 2018-01-12 VITALS
BODY MASS INDEX: 27.87 KG/M2 | DIASTOLIC BLOOD PRESSURE: 70 MMHG | HEART RATE: 84 BPM | HEIGHT: 59 IN | WEIGHT: 138.25 LBS | SYSTOLIC BLOOD PRESSURE: 108 MMHG | RESPIRATION RATE: 17 BRPM | OXYGEN SATURATION: 97 %

## 2018-01-13 VITALS
HEART RATE: 114 BPM | OXYGEN SATURATION: 98 % | SYSTOLIC BLOOD PRESSURE: 102 MMHG | WEIGHT: 139 LBS | DIASTOLIC BLOOD PRESSURE: 62 MMHG | BODY MASS INDEX: 28.02 KG/M2 | TEMPERATURE: 97 F | HEIGHT: 59 IN | RESPIRATION RATE: 18 BRPM

## 2018-01-13 VITALS
HEIGHT: 59 IN | TEMPERATURE: 99.1 F | BODY MASS INDEX: 29.23 KG/M2 | SYSTOLIC BLOOD PRESSURE: 110 MMHG | HEART RATE: 96 BPM | RESPIRATION RATE: 16 BRPM | WEIGHT: 145 LBS | OXYGEN SATURATION: 97 % | DIASTOLIC BLOOD PRESSURE: 62 MMHG

## 2018-01-14 VITALS
SYSTOLIC BLOOD PRESSURE: 105 MMHG | HEART RATE: 82 BPM | RESPIRATION RATE: 18 BRPM | WEIGHT: 131 LBS | DIASTOLIC BLOOD PRESSURE: 72 MMHG | HEIGHT: 59 IN | BODY MASS INDEX: 26.41 KG/M2

## 2018-01-14 VITALS
HEIGHT: 59 IN | HEART RATE: 92 BPM | OXYGEN SATURATION: 97 % | RESPIRATION RATE: 16 BRPM | DIASTOLIC BLOOD PRESSURE: 70 MMHG | SYSTOLIC BLOOD PRESSURE: 102 MMHG

## 2018-01-14 NOTE — RESULT NOTES
Verified Results  (1) COMPREHENSIVE METABOLIC PANEL [id number] 79:03YZ Sue Dias     Test Name Result Flag Reference   GLUCOSE 97 mg/dL  65-99   Fasting reference interval   UREA NITROGEN (BUN) 9 mg/dL  7-25   CREATININE 0 71 mg/dL  0 50-1 10   eGFR NON-AFR  AMERICAN 118 mL/min/1 73m2  > OR = 60   eGFR AFRICAN AMERICAN 136 mL/min/1 73m2  > OR = 60   BUN/CREATININE RATIO   1-92   NOT APPLICABLE (calc)   SODIUM 141 mmol/L  135-146   POTASSIUM 4 1 mmol/L  3 5-5 3   CHLORIDE 106 mmol/L     CARBON DIOXIDE 28 mmol/L  20-31   CALCIUM 8 9 mg/dL  8 6-10 2   PROTEIN, TOTAL 6 7 g/dL  6 1-8 1   ALBUMIN 3 7 g/dL  3 6-5 1   GLOBULIN 3 0 g/dL (calc)  1 9-3 7   ALBUMIN/GLOBULIN RATIO 1 2 (calc)  1 0-2 5   BILIRUBIN, TOTAL 0 8 mg/dL  0 2-1 2   ALKALINE PHOSPHATASE 71 U/L     AST 47 U/L H 10-30   ALT 87 U/L H 6-29     (Q) CBC (H/H, RBC, INDICES, WBC, PLT) [id number] 01:26PM Amber Lutz     Test Name Result Flag Reference   WHITE BLOOD CELL COUNT 5 6 Thousand/uL  3 8-10 8   RED BLOOD CELL COUNT 3 92 Million/uL  3 80-5 10   HEMOGLOBIN 10 7 g/dL L 11 7-15 5   HEMATOCRIT 32 2 % L 35 0-45 0   MCV 82 1 fL  80 0-100 0   MCH 27 3 pg  27 0-33 0   MCHC 33 2 g/dL  32 0-36 0   RDW 14 1 %  11 0-15 0   PLATELET COUNT 297 Thousand/uL  140-400   MPV 10 0 fL  7 5-12 5       Plan  Infectious mononucleosis    · (1) CBC/PLT/DIFF; Status:Active; Requested LRQ:[id number];    · (1) COMPREHENSIVE METABOLIC PANEL; Status:Active;  Requested MUU:[id number];

## 2018-01-16 NOTE — RESULT NOTES
Verified Results  (1) COMPREHENSIVE METABOLIC PANEL [id number] 40:93RT [facility]     Test Name Result Flag Reference   GLUCOSE 72 mg/dL  65-99   Fasting reference interval   UREA NITROGEN (BUN) 11 mg/dL  7-25   CREATININE 0 77 mg/dL  0 50-1 10   eGFR NON-AFR   AMERICAN 107 mL/min/1 73m2  > OR = 60   eGFR AFRICAN AMERICAN 124 mL/min/1 73m2  > OR = 60   BUN/CREATININE RATIO   9-64   NOT APPLICABLE (calc)   SODIUM 136 mmol/L  135-146   POTASSIUM 3 8 mmol/L  3 5-5 3   CHLORIDE 103 mmol/L     CARBON DIOXIDE 27 mmol/L  20-31   CALCIUM 9 0 mg/dL  8 6-10 2   PROTEIN, TOTAL 6 7 g/dL  6 1-8 1   ALBUMIN 3 9 g/dL  3 6-5 1   GLOBULIN 2 8 g/dL (calc)  1 9-3 7   ALBUMIN/GLOBULIN RATIO 1 4 (calc)  1 0-2 5   BILIRUBIN, TOTAL 0 6 mg/dL  0 2-1 2   ALKALINE PHOSPHATASE 71 U/L     AST 22 U/L  10-30   ALT 53 U/L H 6-29     (1) CBC/PLT/DIFF [id number] 12:19PM Amber Lutz   REPORT COMMENT:  FASTING:NO     Test Name Result Flag Reference   WHITE BLOOD CELL COUNT 5 0 Thousand/uL  3 8-10 8   RED BLOOD CELL COUNT 3 98 Million/uL  3 80-5 10   HEMOGLOBIN 10 8 g/dL L 11 7-15 5   HEMATOCRIT 32 8 % L 35 0-45 0   MCV 82 4 fL  80 0-100 0   MCH 27 1 pg  27 0-33 0   MCHC 32 9 g/dL  32 0-36 0   RDW 13 6 %  11 0-15 0   PLATELET COUNT 328 Thousand/uL  140-400   ABSOLUTE NEUTROPHILS 2105 cells/uL  9407-6496   ABSOLUTE LYMPHOCYTES 2315 cells/uL  850-3900   ABSOLUTE MONOCYTES 370 cells/uL  200-950   ABSOLUTE EOSINOPHILS 170 cells/uL     ABSOLUTE BASOPHILS 40 cells/uL  0-200   NEUTROPHILS 42 1 %     LYMPHOCYTES 46 3 %     MONOCYTES 7 4 %     EOSINOPHILS 3 4 %     BASOPHILS 0 8 %     MPV 9 3 fL  7 5-12 5

## 2018-01-22 VITALS
TEMPERATURE: 99.2 F | SYSTOLIC BLOOD PRESSURE: 100 MMHG | DIASTOLIC BLOOD PRESSURE: 62 MMHG | BODY MASS INDEX: 28.43 KG/M2 | OXYGEN SATURATION: 98 % | HEART RATE: 90 BPM | RESPIRATION RATE: 18 BRPM | WEIGHT: 141 LBS | HEIGHT: 59 IN

## 2018-01-23 NOTE — RESULT NOTES
Verified Results  (1) CBC/ PLT (NO DIFF) [id number] 12:42PM Sebas Hanson Order Number: [id number]     Test Name Result Flag Reference   HEMATOCRIT 40 6 %  34 8-46 1   HEMOGLOBIN 13 1 g/dL  11 5-15 4   MCHC 32 3 g/dL  31 4-37 4   MCH 25 9 pg L 26 8-34 3   MCV 80 fL L 82-98   PLATELET COUNT 043 Thousands/uL  149-390   RBC COUNT 5 05 Million/uL  3 81-5 12   RDW 13 8 %  11 6-15 1   WBC COUNT 5 61 Thousand/uL  4 31-10 16   MPV 9 6 fL  8 9-12 7     (1) FERRITIN [id number] 12:42PM Sebas Hanson Order Number: [id number]     Test Name Result Flag Reference   FERRITIN 23 ng/mL  9-950

## 2018-01-27 NOTE — RESULT NOTES
Verified Results  (1) URINALYSIS w URINE C/S REFLEX (will reflex a microscopy if leukocytes, occult blood, or nitrites are not within normal limits) [id number] 12:00AM Ale Holbrook     Test Name Result Flag Reference   SPECIFIC GRAVITY 1 021  1 001-1 035   BILIRUBIN NEGATIVE  NEGATIVE   GLUCOSE NEGATIVE  NEGATIVE   COLOR YELLOW  YELLOW   APPEARANCE CLOUDY A CLEAR   PH 7 5  5 0-8 0   KETONES NEGATIVE  NEGATIVE   OCCULT BLOOD NEGATIVE  NEGATIVE   PROTEIN NEGATIVE  NEGATIVE   SQUAMOUS EPITHELIAL CELLS 20-27 /HPF A < OR = 5   HYALINE CAST NONE SEEN /LPF  NONE SEEN   REFLEXIVE URINE CULTURE      CULTURE INDICATED - RESULTS TO FOLLOW   RBC NONE SEEN /HPF  < OR = 2   NITRITE NEGATIVE  NEGATIVE   LEUKOCYTE ESTERASE TRACE A NEGATIVE   WBC 40-60 /HPF A < OR = 5   BACTERIA FEW /HPF A NONE SEEN     REFLEXIVE URINE CULTURE [id number] 12:00AM Ale Holrbook     Test Name Result Flag Reference   CULTURE, URINE, ROUTINE (Report) A    CULTURE, URINE, ROUTINE       MICRO NUMBER:   [id number]   TEST STATUS:    FINAL   SPECIMEN SOURCE:  URINE   SPECIMEN QUALITY: ADEQUATE   RESULT:      Greater than 100,000 CFU/mL of             Staphylococcus saprophyticus             [facility] (M100             guidelines), does not advise routine              susceptibility testing of urine isolates of             S saprophyticus because infections respond to             urinary concentrations of agents commonly used to             treat acute, uncomplicated UTI such as             Nitrofurantoin, Trimethoprim-sulfamethoxazole or             a fluoroquinolone

## 2018-01-29 ENCOUNTER — OFFICE VISIT (OUTPATIENT)
Dept: OBGYN CLINIC | Facility: CLINIC | Age: 28
End: 2018-01-29
Payer: COMMERCIAL

## 2018-01-29 VITALS — DIASTOLIC BLOOD PRESSURE: 76 MMHG | SYSTOLIC BLOOD PRESSURE: 122 MMHG | BODY MASS INDEX: 28.48 KG/M2 | WEIGHT: 141 LBS

## 2018-01-29 DIAGNOSIS — Z11.3 SCREEN FOR STD (SEXUALLY TRANSMITTED DISEASE): Primary | ICD-10-CM

## 2018-01-29 DIAGNOSIS — Z01.419 ENCNTR FOR GYN EXAM (GENERAL) (ROUTINE) W/O ABN FINDINGS: ICD-10-CM

## 2018-01-29 PROBLEM — G43.909 MIGRAINE: Status: ACTIVE | Noted: 2017-01-16

## 2018-01-29 PROBLEM — H53.9 VISION CHANGES: Status: ACTIVE | Noted: 2017-01-16

## 2018-01-29 PROBLEM — D50.9 IRON DEFICIENCY ANEMIA: Status: ACTIVE | Noted: 2017-11-07

## 2018-01-29 PROBLEM — H91.90 HEARING DECREASED: Status: ACTIVE | Noted: 2017-11-07

## 2018-01-29 PROBLEM — H65.02 ACUTE SEROUS OTITIS MEDIA OF LEFT EAR: Status: ACTIVE | Noted: 2017-11-01

## 2018-01-29 PROBLEM — R93.0 ABNORMAL CT SCAN, HEAD: Status: ACTIVE | Noted: 2017-01-16

## 2018-01-29 PROCEDURE — 99395 PREV VISIT EST AGE 18-39: CPT | Performed by: NURSE PRACTITIONER

## 2018-01-29 RX ORDER — FERROUS SULFATE 325(65) MG
1 TABLET ORAL DAILY
COMMUNITY
Start: 2017-11-07 | End: 2018-05-04 | Stop reason: SDUPTHER

## 2018-01-29 NOTE — ASSESSMENT & PLAN NOTE
Reviewed pap guidelines  Pt would like pap smear today  Pap with reflex to HPV done today  Normal exam today    RTO 1 year or sooner as needed

## 2018-01-29 NOTE — PROGRESS NOTES
Assessment/Plan:    Encntr for gyn exam (general) (routine) w/o abn findings  Reviewed pap guidelines  Pt would like pap smear today  Pap with reflex to HPV done today  Normal exam today  RTO 1 year or sooner as needed     Screen for STD (sexually transmitted disease)  Will screen for GC/CT/Trich off pap  Rx for HIV/Hepatitis B & C/ RPR given today  Diagnoses and all orders for this visit:    Screen for STD (sexually transmitted disease)  -     GP PAP/CT/GC (Reflex HPV PLUS when ASC-US)  -     GP Trichomonas By Multiplex PCR  -     HIV 1/2 AG-AB combo  -     Hepatitis C antibody  -     Hepatitis B core antibody, IgM  -     RPR    Encntr for gyn exam (general) (routine) w/o abn findings  -     GP PAP/CT/GC (Reflex HPV PLUS when ASC-US)    Other orders  -     ferrous sulfate 325 (65 Fe) mg tablet; Take 1 tablet by mouth daily          Subjective:      Patient ID: Bernadine Lewis is a 33 y o  female  Pt is here for annual well woman exam   Last exam October 2017 Pap with GC/CT done at that time - Negative  Pt denies any abnormal vaginal discharge, itching, or odor  Menstrual History:  OB History     No data available   Pt currently in a mutually exclusive relationship with same partner x 8 months, pt would like STD/HIV/Hepatitis and RPR testing today  Pt denies practicing SBE monthly  Pt denies any bowel or bladder issues  Pt follows with PCP for regular check-ups and blood work  Menarche age: 14  LMP 1/15/18  Period Cycle (Days): 28  Period Duration (Days): 6-7  Period Pattern: Regular  Menstrual Flow: Moderate, Heavy  Menstrual Control: Maxi pad  Menstrual Control Change Freq (Hours): 2-3 hours  Dysmenorrhea: (!) Mild  Dysmenorrhea Symptoms: Cramping                Gynecologic Exam         The following portions of the patient's history were reviewed and updated as appropriate:   She  has a past medical history of Allergic rhinitis (12/06/2010); Anemia; Gestational diabetes;  Hyperemesis gravidarum, antepartum; Lump in neck (2011); Palpitations (2011); Paronychia of finger; and Viral gastroenteritis  She  does not have any pertinent problems on file  She  has a past surgical history that includes Appendectomy;  section; and Tubal ligation  Her family history includes Depression in her family and mother; Diabetes in her mother  She  reports that she has never smoked  She has never used smokeless tobacco  She reports that she does not drink alcohol or use drugs  Current Outpatient Prescriptions   Medication Sig Dispense Refill    ferrous sulfate 325 (65 Fe) mg tablet Take 1 tablet by mouth daily      ondansetron (ZOFRAN) 4 mg tablet Take 1 tablet by mouth every 8 (eight) hours as needed for nausea or vomiting for up to 3 days 10 tablet 0    oxyCODONE-acetaminophen (PERCOCET) 5-325 mg per tablet Take 1 tablet by mouth every 8 (eight) hours as needed for moderate pain Max Daily Amount: 3 tablets 12 tablet 0     No current facility-administered medications for this visit  She is allergic to vicodin [hydrocodone-acetaminophen]; amoxicillin; and zyrtec [cetirizine]       Review of Systems   All other systems reviewed and are negative  Objective:     Physical Exam   Constitutional: She is oriented to person, place, and time  She appears well-developed and well-nourished  HENT:   Head: Normocephalic  Neck: Normal range of motion  Neck supple  Cardiovascular: Normal rate, regular rhythm and normal heart sounds  Pulmonary/Chest: Effort normal and breath sounds normal    Abdominal: Soft  Bowel sounds are normal  Hernia confirmed negative in the right inguinal area and confirmed negative in the left inguinal area  Genitourinary: Vagina normal and uterus normal  No breast swelling, tenderness, discharge or bleeding  No labial fusion  There is no rash, tenderness, lesion or injury on the right labia  There is no rash, tenderness, lesion or injury on the left labia  Cervix exhibits no motion tenderness, no discharge and no friability  Right adnexum displays no mass, no tenderness and no fullness  Left adnexum displays no mass, no tenderness and no fullness  Musculoskeletal: Normal range of motion  Lymphadenopathy:        Right: No inguinal adenopathy present  Left: No inguinal adenopathy present  Neurological: She is alert and oriented to person, place, and time  Skin: Skin is warm and dry  Psychiatric: She has a normal mood and affect   Her behavior is normal  Judgment and thought content normal

## 2018-01-29 NOTE — PROGRESS NOTES
Assessment/Plan:     No problem-specific Assessment & Plan notes found for this encounter  Diagnoses and all orders for this visit:    Screen for STD (sexually transmitted disease)  -     GP PAP/CT/GC (Reflex HPV PLUS when ASC-US)  -     GP Trichomonas By Multiplex PCR  -     HIV 1/2 AG-AB combo  -     Hepatitis C antibody  -     Hepatitis B core antibody, IgM  -     RPR    Encntr for gyn exam (general) (routine) w/o abn findings  -     GP PAP/CT/GC (Reflex HPV PLUS when ASC-US)    Other orders  -     ferrous sulfate 325 (65 Fe) mg tablet; Take 1 tablet by mouth daily          Subjective:      Patient ID: Lynsey Kenny is a 33 y o  female  Pt is here for annual well woman exam   Last exam October 2017 Pap with GC/CT done at that time - Negative  Pt denies any abnormal vaginal discharge, itching, or odor  Menstrual History:  OB History     No data available   Pt currently in a mutually exclusive relationship with same partner x 8 months, pt would like STD/HIV/Hepatitis and RPR testing today  Pt denies practicing SBE monthly  Pt denies any bowel or bladder issues  Pt follows with PCP for regular check-ups and blood work  Menarche age: 14  LMP 1/15/18  Period Cycle (Days): 28  Period Duration (Days): 6-7  Period Pattern: Regular  Menstrual Flow: Moderate, Heavy  Menstrual Control: Maxi pad  Menstrual Control Change Freq (Hours): 2-3 hours  Dysmenorrhea: (!) Mild  Dysmenorrhea Symptoms: Cramping      Gynecologic Exam         The following portions of the patient's history were reviewed and updated as appropriate: allergies, current medications, past family history, past medical history, past social history, past surgical history and problem list     Review of Systems   All other systems reviewed and are negative  Objective:     Physical Exam   Constitutional: She is oriented to person, place, and time  She appears well-developed and well-nourished  HENT:   Head: Normocephalic     Neck: Normal range of motion  Neck supple  Cardiovascular: Normal rate, regular rhythm and normal heart sounds  Pulmonary/Chest: Effort normal and breath sounds normal    Abdominal: Soft  Bowel sounds are normal  Hernia confirmed negative in the right inguinal area and confirmed negative in the left inguinal area  Genitourinary: Vagina normal and uterus normal  No breast swelling, tenderness, discharge or bleeding  No labial fusion  There is no rash, tenderness, lesion or injury on the right labia  There is no rash, tenderness, lesion or injury on the left labia  Cervix exhibits no motion tenderness, no discharge and no friability  Right adnexum displays no mass, no tenderness and no fullness  Left adnexum displays no mass, no tenderness and no fullness  Musculoskeletal: Normal range of motion  Lymphadenopathy:        Right: No inguinal adenopathy present  Left: No inguinal adenopathy present  Neurological: She is alert and oriented to person, place, and time  Skin: Skin is warm and dry  Psychiatric: She has a normal mood and affect   Her behavior is normal  Judgment and thought content normal

## 2018-01-29 NOTE — PROGRESS NOTES
Assessment/Plan:    Subjective      Modesta Pratt is a 33 y o  female who presents for annual well woman exam  Periods are regular every 28-30 days, lasting 7 days  No intermenstrual bleeding, spotting, or discharge  Current contraception: tubal ligation  History of abnormal Pap smear: no  Family history of uterine or ovarian cancer: no  Regular self breast exam: no    Menstrual History:  OB History     No data available         Menarche age: 14  No LMP recorded  Period Cycle (Days): 28  Period Duration (Days): 6-7  Period Pattern: Regular  Menstrual Flow: Moderate, Heavy  Menstrual Control: Maxi pad  Menstrual Control Change Freq (Hours): 2-3 hours  Dysmenorrhea: (!) Mild  Dysmenorrhea Symptoms: Cramping    The following portions of the patient's history were reviewed and updated as appropriate: {history reviewed:20406}  Review of Systems     Pertinent items are noted in HPI  Objective      /76 (BP Location: Left arm, Cuff Size: Standard)   Wt 64 kg (141 lb)   BMI 28 48 kg/m²     General:   alert and oriented, in no acute distress, alert, appears stated age and cooperative   Heart: regular rate and rhythm, S1, S2 normal, no murmur, click, rub or gallop   Lungs: clear to auscultation bilaterally   Abdomen: soft, non-tender, without masses or organomegaly   Vulva: normal   Vagina: normal mucosa, normal discharge, no palpable nodules   Cervix: no bleeding following Pap, no cervical motion tenderness and no lesions   Uterus: normal size, normal shape and consistency   Adnexa: normal adnexa and no mass, fullness, tenderness        Assessment      @well woman@   Plan      Blood tests: HIV/Hep B & C/RPR  Chlamydia specimen  GC specimen  Thin prep Pap smear  No problem-specific Assessment & Plan notes found for this encounter         Diagnoses and all orders for this visit:    Screen for STD (sexually transmitted disease)  -     GP PAP/CT/GC (Reflex HPV PLUS when ASC-US)  -     GP Trichomonas By Multiplex PCR  -     HIV 1/2 AG-AB combo  -     Hepatitis C antibody  -     Hepatitis B core antibody, IgM  -     RPR    Encntr for gyn exam (general) (routine) w/o abn findings  -     GP PAP/CT/GC (Reflex HPV PLUS when ASC-US)    Other orders  -     ferrous sulfate 325 (65 Fe) mg tablet; Take 1 tablet by mouth daily     HPI  {Common ambulatory SmartLinks:72545}    Review of Systems   All other systems reviewed and are negative          Objective:     Physical Exam

## 2018-01-30 ENCOUNTER — TELEPHONE (OUTPATIENT)
Dept: OBGYN CLINIC | Facility: CLINIC | Age: 28
End: 2018-01-30

## 2018-02-02 LAB
DEPRECATED C TRACH RRNA XXX QL PRB: NOT DETECTED
HBV CORE IGM SERPL QL IA: NORMAL
HCV AB S/CO SERPL IA: 0.02
HCV AB SERPL QL IA: NORMAL
HIV 1+2 AB+HIV1 P24 AG SERPL QL IA: NORMAL
N GONORRHOEA DNA UR QL NAA+PROBE: NOT DETECTED
RPR SER QL: NORMAL
T VAGINALIS RRNA SPEC QL NAA+PROBE: NOT DETECTED
THIN PREP CVX: NORMAL

## 2018-02-05 ENCOUNTER — TELEPHONE (OUTPATIENT)
Dept: OBGYN CLINIC | Facility: CLINIC | Age: 28
End: 2018-02-05

## 2018-03-13 ENCOUNTER — OFFICE VISIT (OUTPATIENT)
Dept: FAMILY MEDICINE CLINIC | Facility: CLINIC | Age: 28
End: 2018-03-13
Payer: COMMERCIAL

## 2018-03-13 VITALS
OXYGEN SATURATION: 98 % | DIASTOLIC BLOOD PRESSURE: 70 MMHG | WEIGHT: 146 LBS | TEMPERATURE: 99.1 F | RESPIRATION RATE: 16 BRPM | SYSTOLIC BLOOD PRESSURE: 122 MMHG | HEIGHT: 59 IN | BODY MASS INDEX: 29.43 KG/M2 | HEART RATE: 90 BPM

## 2018-03-13 DIAGNOSIS — H65.05 RECURRENT ACUTE SEROUS OTITIS MEDIA OF LEFT EAR: Primary | ICD-10-CM

## 2018-03-13 DIAGNOSIS — J00 ACUTE NASOPHARYNGITIS: ICD-10-CM

## 2018-03-13 PROCEDURE — 3008F BODY MASS INDEX DOCD: CPT | Performed by: PHYSICIAN ASSISTANT

## 2018-03-13 PROCEDURE — 99213 OFFICE O/P EST LOW 20 MIN: CPT | Performed by: PHYSICIAN ASSISTANT

## 2018-03-13 RX ORDER — CEFUROXIME AXETIL 500 MG/1
500 TABLET ORAL EVERY 12 HOURS SCHEDULED
Qty: 10 TABLET | Refills: 0 | Status: SHIPPED | OUTPATIENT
Start: 2018-03-13 | End: 2018-03-18

## 2018-03-13 NOTE — PROGRESS NOTES
Assessment/Plan:     Diagnoses and all orders for this visit:    Recurrent acute serous otitis media of left ear  - Allergic to Amoxicillin  - Directed Pt to return if symptoms persist despite antibiotics, unremitting headache, ear drainage or fever over 101   -     cefuroxime (CEFTIN) 500 mg tablet; Take 1 tablet (500 mg total) by mouth every 12 (twelve) hours for 5 days    Acute nasopharyngitis  - advised to continue OTC supportive care with Tylenol/Motrin, Mucinex and saline gargle   - encouraged rest and fluid intake   - educated Pt that cough can take 10-14 days total to resolve  - directed to return if fever over 102, symptoms persist for 14 days, thick productive cough        Subjective:    Patient ID: Marcela Kapoor is a 33 y o  female  Pt is presenting today for Dry cough, left ear pain, maxillary sinus pressure and congestion for 4 days  Her symptoms have progressed from a sore throat to a more significant cough  She has a history of recurrent ear infections  Denies drainage  Tmax of 101 1 2 days ago  URI    The maximum temperature recorded prior to her arrival was 101 - 101 9 F  The fever has been present for 3 to 4 days  Associated symptoms include congestion, coughing (productive), ear pain (left), a plugged ear sensation and sinus pain (maxillary)  Pertinent negatives include no chest pain, diarrhea, headaches, nausea, rash, rhinorrhea, sore throat or wheezing  She has tried NSAIDs for the symptoms  The following portions of the patient's history were reviewed and updated as appropriate: allergies, current medications, past medical history and problem list     Review of Systems   Constitutional: Positive for chills and fever  Negative for fatigue and unexpected weight change  HENT: Positive for congestion, ear pain (left) and sinus pain (maxillary)  Negative for facial swelling, hearing loss, rhinorrhea and sore throat  Respiratory: Positive for cough (productive)   Negative for shortness of breath and wheezing  Cardiovascular: Negative for chest pain, palpitations and leg swelling  Gastrointestinal: Negative for constipation, diarrhea and nausea  Musculoskeletal: Negative for arthralgias and myalgias  Skin: Negative for rash  Neurological: Negative for headaches  Objective:  /70 (BP Location: Right arm, Patient Position: Sitting, Cuff Size: Standard)   Pulse 90   Temp 99 1 °F (37 3 °C)   Resp 16   Ht 4' 11" (1 499 m)   Wt 66 2 kg (146 lb)   SpO2 98%   PF 98 L/min   BMI 29 49 kg/m²      Physical Exam   Constitutional: She is oriented to person, place, and time  She appears well-developed and well-nourished  No distress  HENT:   Head: Normocephalic and atraumatic  Right Ear: Hearing, tympanic membrane, external ear and ear canal normal    Left Ear: Hearing and external ear normal  Tympanic membrane is injected  Nose: Rhinorrhea present  Mouth/Throat: Posterior oropharyngeal erythema present  No oropharyngeal exudate  Eyes: Pupils are equal, round, and reactive to light  Neck: Normal range of motion  Neck supple  Cardiovascular: Normal rate, regular rhythm, normal heart sounds and intact distal pulses  Exam reveals no gallop and no friction rub  No murmur heard  Pulmonary/Chest: Effort normal and breath sounds normal  No respiratory distress  She has no wheezes  She has no rales  Lymphadenopathy:     She has no cervical adenopathy  Neurological: She is alert and oriented to person, place, and time  Skin: She is not diaphoretic

## 2018-05-04 DIAGNOSIS — D64.9 ANEMIA, UNSPECIFIED TYPE: Primary | ICD-10-CM

## 2018-05-07 RX ORDER — FERROUS SULFATE 325(65) MG
TABLET ORAL
Qty: 30 TABLET | Refills: 0 | Status: SHIPPED | OUTPATIENT
Start: 2018-05-07 | End: 2019-01-11

## 2018-07-19 ENCOUNTER — HOSPITAL ENCOUNTER (EMERGENCY)
Facility: HOSPITAL | Age: 28
Discharge: HOME | End: 2018-07-19
Attending: EMERGENCY MEDICINE
Payer: COMMERCIAL

## 2018-07-19 VITALS
RESPIRATION RATE: 18 BRPM | TEMPERATURE: 101.5 F | HEART RATE: 130 BPM | OXYGEN SATURATION: 98 % | WEIGHT: 138.67 LBS | DIASTOLIC BLOOD PRESSURE: 57 MMHG | SYSTOLIC BLOOD PRESSURE: 118 MMHG | BODY MASS INDEX: 28.01 KG/M2

## 2018-07-19 DIAGNOSIS — R11.0 NAUSEA: ICD-10-CM

## 2018-07-19 DIAGNOSIS — J02.9 PHARYNGITIS: ICD-10-CM

## 2018-07-19 DIAGNOSIS — R50.9 FEVER: Primary | ICD-10-CM

## 2018-07-19 PROCEDURE — 99283 EMERGENCY DEPT VISIT LOW MDM: CPT

## 2018-07-19 RX ORDER — AZITHROMYCIN 250 MG/1
500 TABLET, FILM COATED ORAL ONCE
Status: COMPLETED | OUTPATIENT
Start: 2018-07-19 | End: 2018-07-19

## 2018-07-19 RX ORDER — ACETAMINOPHEN 325 MG/1
650 TABLET ORAL ONCE
Status: COMPLETED | OUTPATIENT
Start: 2018-07-19 | End: 2018-07-19

## 2018-07-19 RX ORDER — METOCLOPRAMIDE 10 MG/1
10 TABLET ORAL 4 TIMES DAILY
Qty: 28 TABLET | Refills: 0 | Status: SHIPPED | OUTPATIENT
Start: 2018-07-19 | End: 2018-07-26

## 2018-07-19 RX ORDER — ONDANSETRON 4 MG/1
4 TABLET, ORALLY DISINTEGRATING ORAL ONCE
Status: COMPLETED | OUTPATIENT
Start: 2018-07-19 | End: 2018-07-19

## 2018-07-19 RX ORDER — AZITHROMYCIN 250 MG/1
500 TABLET, FILM COATED ORAL DAILY
Qty: 8 TABLET | Refills: 0 | Status: SHIPPED | OUTPATIENT
Start: 2018-07-20 | End: 2018-07-24

## 2018-07-19 RX ADMIN — ONDANSETRON 4 MG: 4 TABLET, ORALLY DISINTEGRATING ORAL at 03:18

## 2018-07-19 RX ADMIN — ACETAMINOPHEN 650 MG: 325 TABLET, FILM COATED ORAL at 03:18

## 2018-07-19 RX ADMIN — AZITHROMYCIN 500 MG: 250 TABLET, FILM COATED ORAL at 03:18

## 2018-07-19 NOTE — DISCHARGE INSTRUCTIONS
Acute Nausea and Vomiting   WHAT YOU NEED TO KNOW:   Acute nausea and vomiting start suddenly, worsen quickly, and last a short time  DISCHARGE INSTRUCTIONS:   Return to the emergency department if:   · You see blood in your vomit or your bowel movements  · You have sudden, severe pain in your chest and upper abdomen after hard vomiting or retching  · You have swelling in your neck and chest      · You are dizzy, cold, and thirsty and your eyes and mouth are dry  · You are urinating very little or not at all  · You have muscle weakness, leg cramps, and trouble breathing  · Your heart is beating much faster than normal      · You continue to vomit for more than 48 hours  Contact your healthcare provider if:   · You have frequent dry heaves (vomiting but nothing comes out)  · Your nausea and vomiting does not get better or go away after you use medicine  · You have questions or concerns about your condition or treatment  Medicines: You may need any of the following:  · Medicines  may be given to calm your stomach and stop your vomiting  You may also need medicines to help you feel more relaxed or to stop nausea and vomiting caused by motion sickness  · Gastrointestinal stimulants  are used to help empty your stomach and bowels  This may help decrease nausea and vomiting  · Take your medicine as directed  Contact your healthcare provider if you think your medicine is not helping or if you have side effects  Tell him or her if you are allergic to any medicine  Keep a list of the medicines, vitamins, and herbs you take  Include the amounts, and when and why you take them  Bring the list or the pill bottles to follow-up visits  Carry your medicine list with you in case of an emergency  Prevent or manage acute nausea and vomiting:   · Do not drink alcohol  Alcohol may upset or irritate your stomach  Too much alcohol can also cause acute nausea and vomiting  · Control stress    Headaches due to stress may cause nausea and vomiting  Find ways to relax and manage your stress  Get more rest and sleep  · Drink more liquids as directed  Vomiting can lead to dehydration  It is important to drink more liquids to help replace lost body fluids  Ask your healthcare provider how much liquid to drink each day and which liquids are best for you  Your provider may recommend that you drink an oral rehydration solution (ORS)  ORS contains water, salts, and sugar that are needed to replace the lost body fluids  Ask what kind of ORS to use, how much to drink, and where to get it  · Eat smaller meals, more often  Eat small amounts of food every 2 to 3 hours, even if you are not hungry  Food in your stomach may decrease your nausea  · Talk to your healthcare provider before you take over-the-counter (OTC) medicines  These medicines can cause serious problems if you use certain other medicines, or you have a medical condition  You may have problems if you use too much or use them for longer than the label says  Follow directions on the label carefully  Follow up with your healthcare provider as directed:  Write down your questions so you remember to ask them during your follow-up visits  © 2017 [employer]  Information is for End User's use only and may not be sold, redistributed or otherwise used for commercial purposes  All illustrations and images included in CareNotes® are the copyrighted property of A [employer]  or Anthony Davis  The above information is an  only  It is not intended as medical advice for individual conditions or treatments  Talk to your doctor, nurse or pharmacist before following any medical regimen to see if it is safe and effective for you  Fever in Adults   WHAT YOU NEED TO KNOW:   A fever is an increase in your body temperature  Normal body temperature is 98 6°F (37°C)  Fever is generally defined as greater than 100 4°F (38°C)   Common causes include an infection, injury, or disease such as arthritis  DISCHARGE INSTRUCTIONS:   Return to the emergency department if:   · Your fever does not go away or gets worse even after treatment  · You have a stiff neck and a bad headache  · You are confused  You may not be able to think clearly or remember things like you normally do  · Your heart beats faster than usual even after treatment  · You have shortness of breath or chest pain when you breathe  · You urinate small amounts or not at all  · Your skin, lips, or nails turn blue  Contact your healthcare provider if:   · You have abdominal pain or you feel bloated  · You have nausea or are vomiting  · You have pain or burning when you urinate, or you have pain in your back  · You have questions or concerns about your condition or care  Medicines: You may need any of the following:  · NSAIDs , such as ibuprofen, help decrease swelling, pain, and fever  This medicine is available with or without a doctor's order  NSAIDs can cause stomach bleeding or kidney problems in certain people  If you take blood thinner medicine, always ask if NSAIDs are safe for you  Always read the medicine label and follow directions  Do not give these medicines to children under 10months of age without direction from your child's healthcare provider  · Acetaminophen  decreases pain and fever  It is available without a doctor's order  Ask how much to take and how often to take it  Follow directions  Read the labels of all other medicines you are using to see if they also contain acetaminophen, or ask your doctor or pharmacist  Acetaminophen can cause liver damage if not taken correctly  Do not use more than 4 grams (4,000 milligrams) total of acetaminophen in one day  · Antibiotics  may be given if you have an infection caused by bacteria  · Take your medicine as directed    Contact your healthcare provider if you think your medicine is not helping or if you have side effects  Tell him of her if you are allergic to any medicine  Keep a list of the medicines, vitamins, and herbs you take  Include the amounts, and when and why you take them  Bring the list or the pill bottles to follow-up visits  Carry your medicine list with you in case of an emergency  Follow up with your healthcare provider as directed:  Write down your questions so you remember to ask them during your visits  Self-care:   · Drink more liquids as directed  A fever makes you sweat  This can increase your risk for dehydration  Liquids can help prevent dehydration  ¨ Drink at least 6 to 8 eight-ounce cups of clear liquids each day  Drink water, juice, or broth  Do not drink sports drinks  They may contain caffeine  ¨ Ask your healthcare provider if you should drink an oral rehydration solution (ORS)  An ORS has the right amounts of water, salts, and sugar you need to replace body fluids  · Dress in lightweight clothes  Shivers may be a sign that your fever is rising  Do not put extra blankets or clothes on  This may cause your fever to rise even higher  Dress in light, comfortable clothing  Use a lightweight blanket or sheet when you sleep  Change your clothes, blanket, or sheets if they get wet  · Cool yourself safely  Take a bath in cool or lukewarm water  Use an ice pack wrapped in a small towel or wet a washcloth with cool water  Place the ice pack or wet washcloth on your forehead or the back of your neck  © 2017 [employer] Information is for End User's use only and may not be sold, redistributed or otherwise used for commercial purposes  All illustrations and images included in CareNotes® are the copyrighted property of A [employer]  or Anthony Davis  The above information is an  only  It is not intended as medical advice for individual conditions or treatments   Talk to your doctor, nurse or pharmacist before following any medical regimen to see if it is safe and effective for you  Pharyngitis   WHAT YOU NEED TO KNOW:   Pharyngitis, or sore throat, is inflammation of the tissues and structures in your pharynx (throat)  Pharyngitis is most often caused by bacteria  It may also be caused by a cold or flu virus  Other causes include smoking, allergies, or acid reflux  DISCHARGE INSTRUCTIONS:   Call 911 for any of the following:   · You have trouble breathing or swallowing because your throat is swollen or sore  Return to the emergency department if:   · You are drooling because it hurts too much to swallow  · Your fever is higher than 102? F (39?C) or lasts longer than 3 days  · You are confused  · You taste blood in your throat  Contact your healthcare provider if:   · Your throat pain gets worse  · You have a painful lump in your throat that does not go away after 5 days  · Your symptoms do not improve after 5 days  · You have questions or concerns about your condition or care  Medicines:  Viral pharyngitis will go away on its own without treatment  Your sore throat should start to feel better in 3 to 5 days for both viral and bacterial infections  You may need any of the following:  · Antibiotics  treat a bacterial infection  · NSAIDs , such as ibuprofen, help decrease swelling, pain, and fever  NSAIDs can cause stomach bleeding or kidney problems in certain people  If you take blood thinner medicine, always ask your healthcare provider if NSAIDs are safe for you  Always read the medicine label and follow directions  · Acetaminophen  decreases pain and fever  It is available without a doctor's order  Ask how much to take and how often to take it  Follow directions  Acetaminophen can cause liver damage if not taken correctly  · Take your medicine as directed  Contact your healthcare provider if you think your medicine is not helping or if you have side effects   Tell him or her if you are allergic to any medicine  Keep a list of the medicines, vitamins, and herbs you take  Include the amounts, and when and why you take them  Bring the list or the pill bottles to follow-up visits  Carry your medicine list with you in case of an emergency  Manage your symptoms:   · Gargle salt water  Mix ¼ teaspoon salt in an 8 ounce glass of warm water and gargle  This may help decrease swelling in your throat  · Drink liquids as directed  You may need to drink more liquids than usual  Liquids may help soothe your throat and prevent dehydration  Ask how much liquid to drink each day and which liquids are best for you  · Use a cool-steam humidifier  to help moisten the air in your room and calm your cough  · Soothe your throat  with cough drops, ice, soft foods, or popsicles  Prevent the spread of pharyngitis:  Cover your mouth and nose when you cough or sneeze  Do not share food or drinks  Wash your hands often  Use soap and water  If soap and water are unavailable, use an alcohol based hand   Follow up with your healthcare provider as directed:  Write down your questions so you remember to ask them during your visits  © 2017 [employer] Information is for End User's use only and may not be sold, redistributed or otherwise used for commercial purposes  All illustrations and images included in CareNotes® are the copyrighted property of A D [employer] M [employer]  or Anthony Davis  The above information is an  only  It is not intended as medical advice for individual conditions or treatments  Talk to your doctor, nurse or pharmacist before following any medical regimen to see if it is safe and effective for you

## 2018-07-19 NOTE — ED PROVIDER NOTES
History  Chief Complaint   Patient presents with    Fever - 75 years or older     patient states earlier today around 6pm she started to run a fever  C/o her throat hurting and nausea  Patient is a 33year old female with sore throat and nausea and fever since yesterday  (+) exposed to strep throat  No cough  No vomiting or diarrhea  No urinary sx  No travel  Was last seen in this ED on 17 for left ear pain  [facility] website checked on this patient and last Rx filled was on 10/28/17 for percocet for 4 day supply  History provided by:  Patient (titi)   used: No        Prior to Admission Medications   Prescriptions Last Dose Informant Patient Reported? Taking?   ferrous sulfate 325 (65 Fe) mg tablet   No No   Sig: TAKE 1 TABLET BY MOUTH ONCE A DAY WITH A MEAL      Facility-Administered Medications: None       Past Medical History:   Diagnosis Date    Allergic rhinitis 2010    Anemia     Gestational diabetes     Hyperemesis gravidarum, antepartum     Lump in neck 2011    Palpitations 2011    Paronychia of finger     Viral gastroenteritis        Past Surgical History:   Procedure Laterality Date    APPENDECTOMY       SECTION      x2    TUBAL LIGATION         Family History   Problem Relation Age of Onset    Depression Mother     Diabetes Mother     Depression Family      I have reviewed and agree with the history as documented  Social History   Substance Use Topics    Smoking status: Never Smoker    Smokeless tobacco: Never Used      Comment: Former smoker per [employer]    Alcohol use No        Review of Systems   Constitutional: Positive for fever  HENT: Positive for sore throat  Respiratory: Negative for cough  Gastrointestinal: Negative for diarrhea, nausea and vomiting  Genitourinary: Negative for difficulty urinating  All other systems reviewed and are negative        Physical Exam  Physical Exam   Constitutional: She is oriented to person, place, and time  She appears well-developed and well-nourished  She appears distressed (mild)  Not toxic  HENT:   Head: Normocephalic and atraumatic  Mouth/Throat: No oropharyngeal exudate  Unable to visualize TMs due to cerumen bilaterally  (+) oropharyngeal erythema  Eyes: No scleral icterus  Neck: Normal range of motion  Neck supple  No tracheal deviation present  Cardiovascular: Regular rhythm and normal heart sounds  No murmur heard  Tachycardia  Pulmonary/Chest: Effort normal and breath sounds normal  No stridor  No respiratory distress  Abdominal: Soft  Bowel sounds are normal  There is no tenderness  Musculoskeletal: She exhibits no edema or deformity  Lymphadenopathy:     She has cervical adenopathy (shotty)  Neurological: She is alert and oriented to person, place, and time  Skin: Skin is warm and dry  No rash noted  Psychiatric: She has a normal mood and affect  Nursing note and vitals reviewed        Vital Signs  ED Triage Vitals [07/19/18 0254]   Temperature Pulse Respirations Blood Pressure SpO2   (!) 101 5 °F (38 6 °C) (!) 130 18 118/57 98 %      Temp Source Heart Rate Source Patient Position - Orthostatic VS BP Location FiO2 (%)   Oral Monitor Lying Right arm --      Pain Score       --           Vitals:    07/19/18 0254   BP: 118/57   Pulse: (!) 130   Patient Position - Orthostatic VS: Lying       Visual Acuity      ED Medications  Medications   acetaminophen (TYLENOL) tablet 650 mg (not administered)   azithromycin (ZITHROMAX) tablet 500 mg (not administered)   ondansetron (ZOFRAN-ODT) dispersible tablet 4 mg (not administered)       Diagnostic Studies  Results Reviewed     None                 No orders to display              Procedures  Procedures       Phone Contacts  ED Phone Contact    ED Course                               MDM  Number of Diagnoses or Management Options  Diagnosis management comments: DDx including but not limited to: viral illness, doubt pneumonia or URI, OM, pharyngitis; doubt cellulitis, UTI, meningitis, sinusitis, Lyme disease  Amount and/or Complexity of Data Reviewed  Decide to obtain previous medical records or to obtain history from someone other than the patient: yes  Obtain history from someone other than the patient: yes  Review and summarize past medical records: yes      CritCare Time    Disposition  Final diagnoses:   Fever   Pharyngitis   Nausea     Time reflects when diagnosis was documented in both MDM as applicable and the Disposition within this note     Time User Action Codes Description Comment    7/19/2018  3:10 AM Amalia Moores Add [R50 9] Fever     7/19/2018  3:10 AM Amalia Moores Add [J02 9] Pharyngitis     7/19/2018  3:10 AM Amalia Reids Add [R11 0] Nausea       ED Disposition     ED Disposition Condition Comment    Discharge  Xochitl Chun discharge to home/self care  Condition at discharge: Stable        Follow-up Information     Follow up With Specialties Details Why Margaret Corrales MD Family Medicine Call in 1 day Drink fluids, motrin/tylenol for fever  Return sooner if persistent fever, vomiting, difficulty breathing, rash, lethargy, neck stiffness, drooling, difficulty urinating  [address] [facility] [address]  [phone]            Patient's Medications   Discharge Prescriptions    AZITHROMYCIN (ZITHROMAX) 250 MG TABLET    Take 2 tablets (500 mg total) by mouth daily for 4 days       Start Date: 7/20/2018 End Date: 7/24/2018       Order Dose: 500 mg       Quantity: 8 tablet    Refills: 0    METOCLOPRAMIDE (REGLAN) 10 MG TABLET    Take 1 tablet (10 mg total) by mouth 4 (four) times a day for 7 days As needed for nausea       Start Date: 7/19/2018 End Date: 7/26/2018       Order Dose: 10 mg       Quantity: 28 tablet    Refills: 0     No discharge procedures on file      ED Provider  Electronically Signed by           Donte Villavicencio Alton Figueredo MD  07/19/18 0129

## 2018-08-15 DIAGNOSIS — B37.3 YEAST VAGINITIS: Primary | ICD-10-CM

## 2018-08-15 RX ORDER — FLUCONAZOLE 150 MG/1
150 TABLET ORAL EVERY OTHER DAY
Qty: 2 TABLET | Refills: 0 | Status: SHIPPED | OUTPATIENT
Start: 2018-08-15 | End: 2018-08-18

## 2019-01-11 ENCOUNTER — OFFICE VISIT (OUTPATIENT)
Dept: FAMILY MEDICINE CLINIC | Facility: CLINIC | Age: 29
End: 2019-01-11
Payer: COMMERCIAL

## 2019-01-11 VITALS
SYSTOLIC BLOOD PRESSURE: 120 MMHG | HEIGHT: 59 IN | TEMPERATURE: 98.2 F | DIASTOLIC BLOOD PRESSURE: 72 MMHG | HEART RATE: 91 BPM | OXYGEN SATURATION: 98 % | WEIGHT: 151 LBS | BODY MASS INDEX: 30.44 KG/M2 | RESPIRATION RATE: 16 BRPM

## 2019-01-11 DIAGNOSIS — Z86.32 HISTORY OF GESTATIONAL DIABETES: ICD-10-CM

## 2019-01-11 DIAGNOSIS — E66.09 CLASS 1 OBESITY DUE TO EXCESS CALORIES WITHOUT SERIOUS COMORBIDITY WITH BODY MASS INDEX (BMI) OF 30.0 TO 30.9 IN ADULT: ICD-10-CM

## 2019-01-11 DIAGNOSIS — D50.9 IRON DEFICIENCY ANEMIA, UNSPECIFIED IRON DEFICIENCY ANEMIA TYPE: ICD-10-CM

## 2019-01-11 DIAGNOSIS — G43.109 MIGRAINE WITH AURA AND WITHOUT STATUS MIGRAINOSUS, NOT INTRACTABLE: Primary | ICD-10-CM

## 2019-01-11 PROCEDURE — 3008F BODY MASS INDEX DOCD: CPT | Performed by: FAMILY MEDICINE

## 2019-01-11 PROCEDURE — 99214 OFFICE O/P EST MOD 30 MIN: CPT | Performed by: FAMILY MEDICINE

## 2019-01-11 PROCEDURE — 3725F SCREEN DEPRESSION PERFORMED: CPT | Performed by: FAMILY MEDICINE

## 2019-01-11 RX ORDER — SUMATRIPTAN 25 MG/1
25 TABLET, FILM COATED ORAL ONCE AS NEEDED
Qty: 6 TABLET | Refills: 0 | Status: SHIPPED | OUTPATIENT
Start: 2019-01-11 | End: 2019-09-16 | Stop reason: ALTCHOICE

## 2019-01-11 NOTE — ASSESSMENT & PLAN NOTE
Classic presentation with aura      MRI and CT from 2 years prior with normal findings    - CBC, CMP, TSH, A1C to RU organic causes  - Directed to use Excedrin at the first sign of aura or migraine  - Directed to use Sumatriptan if no improvement noted with Excedrin  - Directed to FU in 2 weeks

## 2019-01-11 NOTE — PROGRESS NOTES
Assessment/Plan:    Migraine with aura and without status migrainosus, not intractable  Classic presentation with aura  MRI and CT from 2 years prior with normal findings    - CBC, CMP, TSH, A1C to RU organic causes  - Directed to use Excedrin at the first sign of aura or migraine  - Directed to use Sumatriptan if no improvement noted with Excedrin  - Directed to FU in 2 weeks    Iron deficiency anemia  Last seen on labs on 11/2017, Hemoglobin of 10 8  - Ordered CBC and Iron panel  Will call with results  - Will consider restarting iron supplementation based on results  History of gestational diabetes  FBL of 94 in office today  - Ordered A1c    FU in 2 weeks      Discussed case with Dr Olivier Cancino    Subjective:    Patient ID: Marisa Merida is a 33 y o  female  Pt is presenting today for migraine headaches with aura occuring twice over the past 2 week  Migraine for 2 weeks  She noticed white spots which preceded the migraine  She had her second episode last night which started with nausea  It improved with laying down in a dark room  She took Tylenol several hours after it started with no improvement  She has history of migraines which occurred in childhood for several years  They resolved after starting her period  Two years prior she had multiple episodes of migraines followed up with her PCP  She had CT and MRI 2 years prior with normal findings  She has never prescribed any specific medications for management of her migraines  She admits to not liking to take medications  History of gestational diabetes and her mother has DM2  She is concerned that her blood sugar is leading to the migraines or headaches  She was diagnosed with Anemia  Shestopped taking Iron 3 months ago after not getting a refill  Headache    The quality of the pain is described as throbbing  The pain is at a severity of 10/10  Associated symptoms include blurred vision     Fatigue   Associated symptoms include fatigue and headaches  The following portions of the patient's history were reviewed and updated as appropriate: allergies, current medications, past social history and problem list     Review of Systems   Constitutional: Positive for fatigue  Eyes: Positive for blurred vision  Neurological: Positive for headaches  Objective:  /72   Pulse 91   Temp 98 2 °F (36 8 °C)   Resp 16   Ht 4' 11" (1 499 m)   Wt 68 5 kg (151 lb)   SpO2 98%   BMI 30 50 kg/m²      Physical Exam   Constitutional: She is oriented to person, place, and time  She appears well-developed and well-nourished  No distress  HENT:   Head: Normocephalic and atraumatic  Eyes: Pupils are equal, round, and reactive to light  Neck: Normal range of motion  Neck supple  Cardiovascular: Normal rate, regular rhythm, normal heart sounds and intact distal pulses  Exam reveals no gallop and no friction rub  No murmur heard  Pulmonary/Chest: Effort normal and breath sounds normal  No respiratory distress  She has no wheezes  She has no rales  Neurological: She is alert and oriented to person, place, and time  No cranial nerve deficit  Coordination normal    Skin: Skin is warm and dry  She is not diaphoretic  Psychiatric: She has a normal mood and affect  Her behavior is normal  Thought content normal    Vitals reviewed

## 2019-01-11 NOTE — ASSESSMENT & PLAN NOTE
Last seen on labs on 11/2017, Hemoglobin of 10 8  - Ordered CBC and Iron panel  Will call with results  - Will consider restarting iron supplementation based on results

## 2019-01-14 LAB
ALBUMIN SERPL-MCNC: 4.3 G/DL (ref 3.6–5.1)
ALBUMIN/GLOB SERPL: 1.6 (CALC) (ref 1–2.5)
ALP SERPL-CCNC: 58 U/L (ref 33–115)
ALT SERPL-CCNC: 18 U/L (ref 6–29)
AST SERPL-CCNC: 13 U/L (ref 10–30)
BASOPHILS # BLD AUTO: 59 CELLS/UL (ref 0–200)
BASOPHILS NFR BLD AUTO: 1.2 %
BILIRUB SERPL-MCNC: 0.6 MG/DL (ref 0.2–1.2)
BUN SERPL-MCNC: 13 MG/DL (ref 7–25)
BUN/CREAT SERPL: NORMAL (CALC) (ref 6–22)
CALCIUM SERPL-MCNC: 9.2 MG/DL (ref 8.6–10.2)
CHLORIDE SERPL-SCNC: 104 MMOL/L (ref 98–110)
CO2 SERPL-SCNC: 26 MMOL/L (ref 20–32)
CREAT SERPL-MCNC: 0.79 MG/DL (ref 0.5–1.1)
EOSINOPHIL # BLD AUTO: 108 CELLS/UL (ref 15–500)
EOSINOPHIL NFR BLD AUTO: 2.2 %
ERYTHROCYTE [DISTWIDTH] IN BLOOD BY AUTOMATED COUNT: 12.4 % (ref 11–15)
GLOBULIN SER CALC-MCNC: 2.7 G/DL (CALC) (ref 1.9–3.7)
GLUCOSE SERPL-MCNC: 79 MG/DL (ref 65–99)
HBA1C MFR BLD: 4.6 % OF TOTAL HGB
HCT VFR BLD AUTO: 39.8 % (ref 35–45)
HGB BLD-MCNC: 13.3 G/DL (ref 11.7–15.5)
IRON SATN MFR SERPL: 24 % (CALC) (ref 11–50)
IRON SERPL-MCNC: 71 MCG/DL (ref 40–190)
LYMPHOCYTES # BLD AUTO: 1568 CELLS/UL (ref 850–3900)
LYMPHOCYTES NFR BLD AUTO: 32 %
MCH RBC QN AUTO: 27.4 PG (ref 27–33)
MCHC RBC AUTO-ENTMCNC: 33.4 G/DL (ref 32–36)
MCV RBC AUTO: 82.1 FL (ref 80–100)
MONOCYTES # BLD AUTO: 270 CELLS/UL (ref 200–950)
MONOCYTES NFR BLD AUTO: 5.5 %
NEUTROPHILS # BLD AUTO: 2896 CELLS/UL (ref 1500–7800)
NEUTROPHILS NFR BLD AUTO: 59.1 %
PLATELET # BLD AUTO: 244 THOUSAND/UL (ref 140–400)
PMV BLD REES-ECKER: 10.2 FL (ref 7.5–12.5)
POTASSIUM SERPL-SCNC: 4.1 MMOL/L (ref 3.5–5.3)
PROT SERPL-MCNC: 7 G/DL (ref 6.1–8.1)
RBC # BLD AUTO: 4.85 MILLION/UL (ref 3.8–5.1)
SL AMB EGFR AFRICAN AMERICAN: 119 ML/MIN/1.73M2
SL AMB EGFR NON AFRICAN AMERICAN: 103 ML/MIN/1.73M2
SODIUM SERPL-SCNC: 136 MMOL/L (ref 135–146)
TIBC SERPL-MCNC: 302 MCG/DL (CALC) (ref 250–450)
TSH SERPL-ACNC: 1.87 MIU/L
WBC # BLD AUTO: 4.9 THOUSAND/UL (ref 3.8–10.8)

## 2019-01-28 DIAGNOSIS — N64.4 PAIN OF BOTH BREASTS: Primary | ICD-10-CM

## 2019-02-01 ENCOUNTER — HOSPITAL ENCOUNTER (OUTPATIENT)
Dept: MAMMOGRAPHY | Facility: CLINIC | Age: 29
Discharge: HOME | End: 2019-02-01

## 2019-02-06 ENCOUNTER — HOSPITAL ENCOUNTER (OUTPATIENT)
Dept: MAMMOGRAPHY | Facility: CLINIC | Age: 29
Discharge: HOME | End: 2019-02-06
Payer: COMMERCIAL

## 2019-02-06 ENCOUNTER — HOSPITAL ENCOUNTER (OUTPATIENT)
Dept: ULTRASOUND IMAGING | Facility: CLINIC | Age: 29
Discharge: HOME | End: 2019-02-06
Payer: COMMERCIAL

## 2019-02-06 VITALS — WEIGHT: 145 LBS | BODY MASS INDEX: 29.23 KG/M2 | HEIGHT: 59 IN

## 2019-02-06 DIAGNOSIS — N64.4 PAIN OF BOTH BREASTS: ICD-10-CM

## 2019-02-06 PROCEDURE — 76642 ULTRASOUND BREAST LIMITED: CPT

## 2019-02-18 ENCOUNTER — TELEPHONE (OUTPATIENT)
Dept: OBGYN CLINIC | Facility: CLINIC | Age: 29
End: 2019-02-18

## 2019-02-18 NOTE — TELEPHONE ENCOUNTER
Pt received call from our office that breast ultrasound was good, however she received a letter from the facility stating she needed further workup

## 2019-02-20 NOTE — TELEPHONE ENCOUNTER
Spoke with Kayley Dasilva at Radiology and explained we informed patient [address] of breast was normal but the Radiology department sent here a letter saying she needed follow up , I asked Kayley Dasilva is she could speak with the Radiologist who interpreted the ultrasound to clarify if there is malignancy in the left breast or note , Kayley Dasilva states she will and have radiologist send the addendum to Dr Constanza Hart MM CMA

## 2019-02-20 NOTE — TELEPHONE ENCOUNTER
Spoke with patient and informed her that , we have contacted radiology for clarification of her bilateral breast ultrasound    MATT OAKLEY

## 2019-04-23 ENCOUNTER — OFFICE VISIT (OUTPATIENT)
Dept: OBGYN CLINIC | Facility: CLINIC | Age: 29
End: 2019-04-23
Payer: COMMERCIAL

## 2019-04-23 VITALS
WEIGHT: 154 LBS | DIASTOLIC BLOOD PRESSURE: 78 MMHG | HEIGHT: 59 IN | BODY MASS INDEX: 31.04 KG/M2 | SYSTOLIC BLOOD PRESSURE: 108 MMHG

## 2019-04-23 DIAGNOSIS — N92.6 IRREGULAR BLEEDING: Primary | ICD-10-CM

## 2019-04-23 PROCEDURE — 99213 OFFICE O/P EST LOW 20 MIN: CPT | Performed by: PHYSICIAN ASSISTANT

## 2019-04-24 LAB
B-HCG SERPL QL: NEGATIVE
BASOPHILS # BLD AUTO: 59 CELLS/UL (ref 0–200)
BASOPHILS NFR BLD AUTO: 1 %
EOSINOPHIL # BLD AUTO: 112 CELLS/UL (ref 15–500)
EOSINOPHIL NFR BLD AUTO: 1.9 %
ERYTHROCYTE [DISTWIDTH] IN BLOOD BY AUTOMATED COUNT: 12.4 % (ref 11–15)
HCT VFR BLD AUTO: 42.2 % (ref 35–45)
HGB BLD-MCNC: 14.1 G/DL (ref 11.7–15.5)
LYMPHOCYTES # BLD AUTO: 1693 CELLS/UL (ref 850–3900)
LYMPHOCYTES NFR BLD AUTO: 28.7 %
MCH RBC QN AUTO: 27.1 PG (ref 27–33)
MCHC RBC AUTO-ENTMCNC: 33.4 G/DL (ref 32–36)
MCV RBC AUTO: 81.2 FL (ref 80–100)
MONOCYTES # BLD AUTO: 313 CELLS/UL (ref 200–950)
MONOCYTES NFR BLD AUTO: 5.3 %
NEUTROPHILS # BLD AUTO: 3723 CELLS/UL (ref 1500–7800)
NEUTROPHILS NFR BLD AUTO: 63.1 %
PLATELET # BLD AUTO: 341 THOUSAND/UL (ref 140–400)
PMV BLD REES-ECKER: 10 FL (ref 7.5–12.5)
RBC # BLD AUTO: 5.2 MILLION/UL (ref 3.8–5.1)
T4 FREE SERPL-MCNC: 1.3 NG/DL (ref 0.8–1.8)
TSH SERPL-ACNC: 1.91 MIU/L
WBC # BLD AUTO: 5.9 THOUSAND/UL (ref 3.8–10.8)

## 2019-06-11 ENCOUNTER — OFFICE VISIT (OUTPATIENT)
Dept: FAMILY MEDICINE CLINIC | Facility: CLINIC | Age: 29
End: 2019-06-11
Payer: COMMERCIAL

## 2019-06-11 VITALS
BODY MASS INDEX: 31.04 KG/M2 | DIASTOLIC BLOOD PRESSURE: 84 MMHG | HEART RATE: 84 BPM | WEIGHT: 154 LBS | SYSTOLIC BLOOD PRESSURE: 122 MMHG | OXYGEN SATURATION: 98 % | TEMPERATURE: 98.9 F | HEIGHT: 59 IN | RESPIRATION RATE: 18 BRPM

## 2019-06-11 DIAGNOSIS — H65.22 LEFT CHRONIC SEROUS OTITIS MEDIA: Primary | ICD-10-CM

## 2019-06-11 PROCEDURE — 3008F BODY MASS INDEX DOCD: CPT | Performed by: PHYSICIAN ASSISTANT

## 2019-06-11 PROCEDURE — 1036F TOBACCO NON-USER: CPT | Performed by: PHYSICIAN ASSISTANT

## 2019-06-11 PROCEDURE — 99213 OFFICE O/P EST LOW 20 MIN: CPT | Performed by: PHYSICIAN ASSISTANT

## 2019-06-11 RX ORDER — FLUTICASONE PROPIONATE 50 MCG
2 SPRAY, SUSPENSION (ML) NASAL DAILY
Qty: 1 BOTTLE | Refills: 0 | Status: SHIPPED | OUTPATIENT
Start: 2019-06-11 | End: 2019-09-16 | Stop reason: ALTCHOICE

## 2019-08-19 VITALS
DIASTOLIC BLOOD PRESSURE: 72 MMHG | SYSTOLIC BLOOD PRESSURE: 117 MMHG | OXYGEN SATURATION: 99 % | RESPIRATION RATE: 20 BRPM | WEIGHT: 160 LBS | HEART RATE: 94 BPM | BODY MASS INDEX: 32.32 KG/M2 | TEMPERATURE: 98.4 F

## 2019-08-19 PROCEDURE — 99283 EMERGENCY DEPT VISIT LOW MDM: CPT

## 2019-08-20 ENCOUNTER — HOSPITAL ENCOUNTER (EMERGENCY)
Facility: HOSPITAL | Age: 29
Discharge: HOME | End: 2019-08-20
Attending: EMERGENCY MEDICINE | Admitting: EMERGENCY MEDICINE
Payer: COMMERCIAL

## 2019-08-20 ENCOUNTER — APPOINTMENT (EMERGENCY)
Dept: RADIOLOGY | Facility: HOSPITAL | Age: 29
End: 2019-08-20
Payer: COMMERCIAL

## 2019-08-20 DIAGNOSIS — S89.92XA INJURY OF LEFT KNEE, INITIAL ENCOUNTER: Primary | ICD-10-CM

## 2019-08-20 PROCEDURE — 73610 X-RAY EXAM OF ANKLE: CPT

## 2019-08-20 PROCEDURE — 99284 EMERGENCY DEPT VISIT MOD MDM: CPT | Performed by: PHYSICIAN ASSISTANT

## 2019-08-20 PROCEDURE — 73590 X-RAY EXAM OF LOWER LEG: CPT

## 2019-08-20 RX ORDER — IBUPROFEN 600 MG/1
600 TABLET ORAL ONCE
Status: COMPLETED | OUTPATIENT
Start: 2019-08-20 | End: 2019-08-20

## 2019-08-20 RX ORDER — CAPSAICIN 0.07 G/100G
CREAM TOPICAL 3 TIMES DAILY
Qty: 28.3 G | Refills: 0 | Status: SHIPPED | OUTPATIENT
Start: 2019-08-20 | End: 2019-09-16 | Stop reason: ALTCHOICE

## 2019-08-20 RX ORDER — IBUPROFEN 600 MG/1
600 TABLET ORAL EVERY 8 HOURS PRN
Qty: 15 TABLET | Refills: 0 | Status: SHIPPED | OUTPATIENT
Start: 2019-08-20 | End: 2019-09-16 | Stop reason: ALTCHOICE

## 2019-08-20 RX ADMIN — IBUPROFEN 600 MG: 600 TABLET ORAL at 00:35

## 2019-08-21 ENCOUNTER — OFFICE VISIT (OUTPATIENT)
Dept: OBGYN CLINIC | Facility: CLINIC | Age: 29
End: 2019-08-21
Payer: COMMERCIAL

## 2019-08-21 VITALS
HEART RATE: 114 BPM | SYSTOLIC BLOOD PRESSURE: 114 MMHG | WEIGHT: 158.5 LBS | BODY MASS INDEX: 32.01 KG/M2 | DIASTOLIC BLOOD PRESSURE: 76 MMHG

## 2019-08-21 DIAGNOSIS — M25.562 ACUTE PAIN OF LEFT KNEE: Primary | ICD-10-CM

## 2019-08-21 PROCEDURE — 99203 OFFICE O/P NEW LOW 30 MIN: CPT | Performed by: PHYSICAL MEDICINE & REHABILITATION

## 2019-08-21 NOTE — PATIENT INSTRUCTIONS
You may take over-the-counter pain medicine  Acetaminophen (Tylenol) may be taken up to 1000 mg every 8 hours up to 3000 mg/day  An extra-strength Tylenol tablet is 500 mg  Ibuprofen (Advil, Motrin) may be taken up to 800 mg every 8 hours or 600 mg every 6 hours up to 2400 mg/day  An ibuprofen tablet is 200 mg  Naproxen (Aleve) may be taken up to 440 mg every 12 hours up to 880 mg/day  An over-the-counter naproxen tablet is 220 mg  You may combine Acetaminophen (Tylenol) with ibuprofen, naproxen, or aspirin  Do not combine ibuprofen with naproxen  If you have been taking aspirin for a cardiac condition, continue to do so

## 2019-08-21 NOTE — PROGRESS NOTES
Assessments & Orders:   Diagnoses and all orders for this visit:    Acute pain of left knee  -     MRI knee left  wo contrast; Future          Imaging Studies:  I personally reviewed the following images: Two views of the tibia and fibula and three views of left ankle dated 8/20/2019; both the images and official read  Agree with the official read, no acute osseous abnormalities or instability  Impression/Plan:  Left knee pain is possibly due to ACL tear  Also on the differential is a meniscal tear  She has findings of any instability on exam today  Her exam is somewhat limited due to diffuse pain and swelling  She had x-rays of her tibia fibula and ankle and I was able to review that knee on these x-rays but they are not dedicated for this  No acute osseous abnormality  She will remain nonweightbearing with crutches and will follow up with me after the MRI  Return in about 3 days (around 8/24/2019)  Chief Complaint   Patient presents with    Left Knee - Pain        HPI:  Marjorie Baldwin is a 30 y o  female  who presents for evaluation of above chief complaint  Onset/Mechanism:  2 days ago when she was jumping on a trampoline  Location:  Peripatellar and lateral knee  Quality:  Aching and throbbing  Also complains of instability  Radiation:  Denies  Palliative/Provocative:  Nothing has been improving the pain  Everything makes the pain worse  Severity:  I would say 10/10-as per patient  Treatment so far:  Was seen at the emergency room and was given Ace wrap and crutches to remain nonweightbearing  She will continue be nonweightbearing  Review of Systems   Constitutional: Positive for activity change  Negative for fever  HENT: Negative for sore throat  Eyes: Negative for visual disturbance  Respiratory: Negative for shortness of breath  Cardiovascular: Negative for chest pain  Gastrointestinal: Negative for abdominal pain  Endocrine: Negative for polydipsia  Genitourinary: Negative for difficulty urinating  Musculoskeletal: Positive for arthralgias and joint swelling  Skin: Negative for rash and wound  Allergic/Immunologic: Negative for immunocompromised state  Neurological: Positive for weakness  Negative for numbness  Hematological: Does not bruise/bleed easily  Psychiatric/Behavioral: Negative for confusion  Following history reviewed and updated:  Past Medical History:   Diagnosis Date    Allergic rhinitis 2010    Anemia     Gestational diabetes     Hyperemesis gravidarum, antepartum     Lump in neck 2011    Palpitations 2011    Paronychia of finger     Viral gastroenteritis      Past Surgical History:   Procedure Laterality Date    APPENDECTOMY       SECTION      x2    TUBAL LIGATION       Social History   Social History     Substance and Sexual Activity   Alcohol Use No     Social History     Substance and Sexual Activity   Drug Use No     Social History     Tobacco Use   Smoking Status Never Smoker   Smokeless Tobacco Never Used   Tobacco Comment    Former smoker per [employer]     Family History   Problem Relation Age of Onset    Depression Mother     Diabetes Mother     Depression Family     Diabetes Father      Allergies   Allergen Reactions    Vicodin [Hydrocodone-Acetaminophen] Anaphylaxis     No epi pen    Amoxicillin Hives     No epi pen    Zyrtec [Cetirizine] Hives     No epi pen        Constitutional:  /76 (BP Location: Right arm, Patient Position: Sitting, Cuff Size: Standard)   Pulse (!) 114   Wt 71 9 kg (158 lb 8 oz)   BMI 32 01 kg/m²    General: NAD  Eyes: Clear sclerae  ENT: No inflammation, lesion, or mass of lips  No tracheal deviation  Musculoskeletal: As mentioned below  Integumentary: No visible rashes or skin lesions  Pulmonary/Chest: Effort normal  No respiratory distress  Neuro: CN's grossly intact, HENSLEY  Psych: Normal affect and judgement    Vascular: WWP     Left Ankle Exam     Tenderness   The patient is experiencing no tenderness  Swelling: moderate    Range of Motion   Dorsiflexion: normal   Plantar flexion: normal     Tests   Anterior drawer: physiological laxity  Varus tilt: negative    Other   Erythema: absent  Scars: absent  Sensation: normal  Pulse: present    Comments:  Normal patellar tilt and normal external rotation stress test   Normal syndesmosis squeeze  There is no tenderness at the proximal fibula  She does have moderate amount of dependent swelling in the left lower extremity  There is no bruising  Left Knee Exam     Tenderness   The patient is experiencing tenderness in the lateral joint line and LCL  Range of Motion   Extension:  20 abnormal   Flexion:  40 abnormal     Tests   Nahun:  Medial - positive Lateral - positive  Varus: positive Valgus: negative  Lachman:  Anterior - positive        Other   Erythema: absent  Scars: absent  Sensation: normal  Pulse: present  Swelling: mild  Effusion: effusion present             Procedures - none for this visit

## 2019-08-22 ENCOUNTER — HOSPITAL ENCOUNTER (OUTPATIENT)
Dept: MRI IMAGING | Facility: HOSPITAL | Age: 29
Discharge: HOME | End: 2019-08-22
Attending: PHYSICAL MEDICINE & REHABILITATION
Payer: COMMERCIAL

## 2019-08-22 DIAGNOSIS — M25.562 ACUTE PAIN OF LEFT KNEE: ICD-10-CM

## 2019-08-22 PROCEDURE — 73721 MRI JNT OF LWR EXTRE W/O DYE: CPT

## 2019-08-23 ENCOUNTER — OFFICE VISIT (OUTPATIENT)
Dept: OBGYN CLINIC | Facility: CLINIC | Age: 29
End: 2019-08-23
Payer: COMMERCIAL

## 2019-08-23 VITALS
DIASTOLIC BLOOD PRESSURE: 76 MMHG | BODY MASS INDEX: 31.85 KG/M2 | HEIGHT: 59 IN | WEIGHT: 158 LBS | SYSTOLIC BLOOD PRESSURE: 122 MMHG | HEART RATE: 92 BPM

## 2019-08-23 DIAGNOSIS — M25.562 ACUTE PAIN OF LEFT KNEE: Primary | ICD-10-CM

## 2019-08-23 DIAGNOSIS — S83.512A COMPLETE TEAR OF ANTERIOR CRUCIATE LIGAMENT OF LEFT KNEE, INITIAL ENCOUNTER: ICD-10-CM

## 2019-08-23 PROCEDURE — 99213 OFFICE O/P EST LOW 20 MIN: CPT | Performed by: PHYSICAL MEDICINE & REHABILITATION

## 2019-08-23 NOTE — PROGRESS NOTES
Assessments & Orders: There are no diagnoses linked to this encounter  Imaging Studies:  I personally reviewed the following images: Two views of the tibia and fibula and three views of left ankle dated 8/20/2019; both the images and official read  Agree with the official read, no acute osseous abnormalities or instability  MRI of the left knee dated 8/22/2019; both images in the official read  Agree with the official read as follows,  "1  Complete midsubstance tear of the ACL  No evidence of a meniscal tear  2   Findings concerning for posteromedial corner injury  The posterolateral corner is normal   3   Small contusion at the posterior aspect of the medial tibial plateau  4   Large knee joint effusion "    Impression/Plan:  Left knee pain secondary to ACL rupture  She will follow up with an orthopedic surgeon for surgical consult  In the interim a give her a brace to keep her knee from hyperextending and she can bear weight as tolerated  No follow-ups on file  Chief Complaint   Patient presents with    Left Knee - Pain        HPI:  Juel Prader is a 30 y o  female  who presents for evaluation of above chief complaint  Onset/Mechanism:  About 4 days ago when she was jumping on a trampoline  Location:  Peripatellar and lateral knee  Quality:  Aching and throbbing  Also complains of instability  Radiation:  Denies  Palliative/Provocative:  Nothing has been improving the pain  Everything makes the pain worse  Severity:  I would say 10/10-as per patient  Treatment so far:  Was seen at the emergency room and was given Ace wrap and crutches to remain nonweightbearing  Review of Systems   Constitutional: Positive for activity change  Negative for fever  HENT: Negative for sore throat  Eyes: Negative for visual disturbance  Respiratory: Negative for shortness of breath  Cardiovascular: Negative for chest pain  Gastrointestinal: Negative for abdominal pain     Endocrine: Negative for polydipsia  Genitourinary: Negative for difficulty urinating  Musculoskeletal: Positive for arthralgias, gait problem and joint swelling  Skin: Negative for rash and wound  Allergic/Immunologic: Negative for immunocompromised state  Neurological: Positive for weakness  Negative for numbness  Hematological: Does not bruise/bleed easily  Psychiatric/Behavioral: Negative for confusion  Following history reviewed and updated:  Past Medical History:   Diagnosis Date    Allergic rhinitis 2010    Anemia     Gestational diabetes     Hyperemesis gravidarum, antepartum     Lump in neck 2011    Palpitations 2011    Paronychia of finger     Viral gastroenteritis      Past Surgical History:   Procedure Laterality Date    APPENDECTOMY       SECTION      x2    TUBAL LIGATION       Social History   Social History     Substance and Sexual Activity   Alcohol Use No     Social History     Substance and Sexual Activity   Drug Use No     Social History     Tobacco Use   Smoking Status Never Smoker   Smokeless Tobacco Never Used   Tobacco Comment    Former smoker per [employer]     Family History   Problem Relation Age of Onset    Depression Mother     Diabetes Mother     Depression Family     Diabetes Father      Allergies   Allergen Reactions    Vicodin [Hydrocodone-Acetaminophen] Anaphylaxis     No epi pen    Amoxicillin Hives     No epi pen    Zyrtec [Cetirizine] Hives     No epi pen        Constitutional:  /76   Pulse 92   Ht 4' 11" (1 499 m)   Wt 71 7 kg (158 lb)   BMI 31 91 kg/m²    General: NAD  Eyes: Clear sclerae  ENT: No inflammation, lesion, or mass of lips  No tracheal deviation  Musculoskeletal: As mentioned below  Integumentary: No visible rashes or skin lesions  Pulmonary/Chest: Effort normal  No respiratory distress  Neuro: CN's grossly intact, HENSLEY  Psych: Normal affect and judgement  Vascular: WWP      Left Knee Exam Tenderness   The patient is experiencing tenderness in the lateral joint line and LCL  Range of Motion   Extension:  20 abnormal   Flexion:  40 abnormal     Tests   Nahun:  Medial - positive Lateral - positive  Varus: positive Valgus: negative  Lachman:  Anterior - positive        Other   Erythema: absent  Scars: absent  Sensation: normal  Pulse: present  Swelling: mild  Effusion: effusion present             Procedures - none for this visit

## 2019-08-23 NOTE — ED PROVIDER NOTES
Pt Name: Delaney Long  MRN: [id number]  YOB: 1990  Age/Sex: 30 y o  female  Date of evaluation: 8/19/2019  PCP: Tracy Gregorio PA-C    CHIEF COMPLAINT    Chief Complaint   Patient presents with    Knee Injury     sustained injury to left knee while jumping on trampoline with her kids felt something "pop"         HPI    Rosalia Rick presents to the Emergency Department complaining of Knee Injury, Leg Injury  Delaney Long is a 30 y o  female who presents due to Knee Injury   Pt reports Knee/Leg injury,sustained injury to left knee while jumping on trampoline with her kids felt something "pop" with suspected hyperextension injury  Pt has had pain with ambulating since gradually onset with swelling of knee  Pt reports no prior injury to this knee, ankle in the past  Denies numbness/tingling, weakness, other injury/trauma and no other complaints at this time        History provided by:  Patient   used: No    Knee Pain   Location:  Knee, ankle and leg  Injury: yes    Mechanism of injury comment:  Trampoline  Leg location:  L lower leg  Knee location:  L knee  Ankle location:  L ankle  Pain details:     Quality:  Pressure and sharp    Radiates to:  Does not radiate    Severity:  Moderate    Onset quality:  Gradual    Timing:  Constant    Progression:  Waxing and waning  Chronicity:  New  Relieved by:  Nothing  Worsened by:  Bearing weight  Ineffective treatments:  None tried  Associated symptoms: decreased ROM (secondary to pain) and swelling    Associated symptoms: no back pain, no fatigue, no fever, no itching, no muscle weakness, no neck pain, no numbness, no stiffness and no tingling    Risk factors: obesity    Risk factors: no concern for non-accidental trauma, no frequent fractures, no known bone disorder and no recent illness          Past Medical and Surgical History    Past Medical History:   Diagnosis Date    Allergic rhinitis 12/06/2010    Anemia     Gestational diabetes     Hyperemesis gravidarum, antepartum     Lump in neck 2011    Palpitations 2011    Paronychia of finger     Viral gastroenteritis        Past Surgical History:   Procedure Laterality Date    APPENDECTOMY       SECTION      x2    TUBAL LIGATION         Family History   Problem Relation Age of Onset    Depression Mother     Diabetes Mother     Depression Family     Diabetes Father        Social History     Tobacco Use    Smoking status: Never Smoker    Smokeless tobacco: Never Used    Tobacco comment: Former smoker per [employer]   Substance Use Topics    Alcohol use: No    Drug use: No              Allergies    Allergies   Allergen Reactions    Vicodin [Hydrocodone-Acetaminophen] Anaphylaxis     No epi pen    Amoxicillin Hives     No epi pen    Zyrtec [Cetirizine] Hives     No epi pen       Home Medications:    Prior to Admission medications    Medication Sig Start Date End Date Taking? Authorizing Provider   capsicum (ZOSTRIX) 0 075 % topical cream Apply topically 3 (three) times a day  Patient not taking: Reported on 2019   Shakira Yeung PA-C   fluticasone [facility]) 50 mcg/act nasal spray 2 sprays into each nostril daily  Patient not taking: Reported on 2019   Azeb Morrison PA-C   ibuprofen (MOTRIN) 600 mg tablet Take 1 tablet (600 mg total) by mouth every 8 (eight) hours as needed for mild pain 19   Shakira Yeung PA-C   SUMAtriptan (IMITREX) 25 mg tablet Take 1 tablet (25 mg total) by mouth once as needed for migraine for up to 1 dose  Patient not taking: Reported on 2019   Azeb Morrison PA-C           Review of Systems    Review of Systems   Constitutional: Negative for activity change, appetite change, chills, diaphoresis, fatigue and fever  Respiratory: Negative for cough and shortness of breath  Cardiovascular: Negative for chest pain  Gastrointestinal: Negative for abdominal pain     Musculoskeletal: Positive for arthralgias (L ankle, L knee), gait problem and joint swelling  Negative for back pain, myalgias, neck pain, neck stiffness and stiffness  Skin: Negative for itching, rash and wound  Neurological: Negative for headaches  All other systems reviewed and negative  Physical Exam      ED Triage Vitals   Temperature Pulse Respirations Blood Pressure SpO2   08/19/19 2320 08/19/19 2320 08/19/19 2320 08/19/19 2324 08/19/19 2320   98 4 °F (36 9 °C) 94 20 117/72 99 %      Temp src Heart Rate Source Patient Position - Orthostatic VS BP Location FiO2 (%)   -- -- -- -- --             Pain Score       08/19/19 2320       9               Physical Exam   Constitutional: She is oriented to person, place, and time  She appears well-developed and well-nourished  No distress  HENT:   Head: Normocephalic and atraumatic  Eyes: Pupils are equal, round, and reactive to light  Conjunctivae and EOM are normal    Neck: Normal range of motion  Neck supple  Cardiovascular: Normal rate, regular rhythm, normal heart sounds and intact distal pulses  Exam reveals no gallop and no friction rub  No murmur heard  Pulmonary/Chest: Effort normal and breath sounds normal  No stridor  No respiratory distress  She has no wheezes  She has no rales  She exhibits no tenderness  Musculoskeletal:        Right shoulder: Normal         Left shoulder: Normal         Right elbow: Normal        Left elbow: Normal         Right wrist: Normal         Left wrist: Normal         Right hip: Normal         Left hip: Normal         Right knee: Normal         Left knee: She exhibits decreased range of motion (secondary to pain) and swelling  She exhibits no effusion, no ecchymosis, no deformity, no laceration, no erythema, normal alignment, no LCL laxity, normal patellar mobility, no bony tenderness, normal meniscus and no MCL laxity  Tenderness found  Medial joint line and lateral joint line tenderness noted          Right ankle: Normal         Left ankle: She exhibits normal range of motion, no swelling, no ecchymosis, no deformity, no laceration and normal pulse  No tenderness  No lateral malleolus, no medial malleolus, no AITFL, no CF ligament, no posterior TFL, no head of 5th metatarsal and no proximal fibula tenderness found  Achilles tendon normal         Cervical back: Normal         Thoracic back: Normal         Lumbar back: Normal         Right upper arm: Normal         Left upper arm: Normal         Right forearm: Normal         Left forearm: Normal         Right hand: Normal         Left hand: Normal         Right foot: Normal         Left foot: Normal    ACL Testing:  Painful Anterior Drawer, Lachman's though difficult to evaluate for forward translation with swelling      PCL Testing:  Negative Posterior Drawer  Negative Sag    MCL Testing:  Negative Valgus  Negative Apley's Distraction    LCL Testing:  Negative Varus   Negative Apley's Distraction    Meniscus Testing:  Negative Thessaly Test  Negative Nahun's  Negative Apley's Grind test    Assessment for Joint Effusion:  Negative Ballot  Negative Bulge  Negative Tap    OTHER:  No signs of quadriceps or patellar tendon injury  Neurological: She is alert and oriented to person, place, and time  Skin: Skin is warm  Capillary refill takes less than 2 seconds  She is not diaphoretic  Nursing note and vitals reviewed            Diagnostic Results    ECG      Labs:    Results for orders placed or performed in visit on 04/23/19   CBC and differential   Result Value Ref Range    White Blood Cell Count 5 9 3 8 - 10 8 Thousand/uL    Red Blood Cell Count 5 20 (H) 3 80 - 5 10 Million/uL    Hemoglobin 14 1 11 7 - 15 5 g/dL    HCT 42 2 35 0 - 45 0 %    MCV 81 2 80 0 - 100 0 fL    MCH 27 1 27 0 - 33 0 pg    MCHC 33 4 32 0 - 36 0 g/dL    RDW 12 4 11 0 - 15 0 %    Platelet Count 583 985 - 400 Thousand/uL    SL AMB MPV 10 0 7 5 - 12 5 fL    Neutrophils (Absolute) 3,723 1,500 - 7,800 cells/uL    Lymphocytes (Absolute) 1,693 850 - 3,900 cells/uL    Monocytes (Absolute) 313 200 - 950 cells/uL    Eosinophils (Absolute) 112 15 - 500 cells/uL    Basophils ABS 59 0 - 200 cells/uL    Neutrophils 63 1 %    Lymphocytes 28 7 %    Monocytes 5 3 %    Eosinophils 1 9 %    Basophils PCT 1 0 %   T4, free   Result Value Ref Range    Free t4 1 3 0 8 - 1 8 ng/dL   TSH, 3rd generation   Result Value Ref Range    TSH 1 91 mIU/L   Pregnancy Test (HCG Qualitative)   Result Value Ref Range    hCG,Beta Subunit,Qual,Serum NEGATIVE See Note:       All labs reviewed and utilized in the medical decision making process    Radiology:    XR ankle 3+ views LEFT   Final Result      No acute osseous abnormality  Workstation performed: [id number]         XR tibia fibula 2 views LEFT   Final Result      No acute osseous abnormality  Workstation performed: [id number]             All radiology studies independently viewed by me and interpreted by the radiologist     Procedure    Procedures      Assessment and Plan    MDM  Number of Diagnoses or Management Options  Injury of left knee, initial encounter: new, needed workup     Amount and/or Complexity of Data Reviewed  Tests in the radiology section of CPT®: ordered and reviewed  Obtain history from someone other than the patient: yes  Review and summarize past medical records: yes  Independent visualization of images, tracings, or specimens: yes    Risk of Complications, Morbidity, and/or Mortality  Presenting problems: moderate  Diagnostic procedures: moderate  Management options: moderate    Patient Progress  Patient progress: stable      Initial ED assessment:  Niall Phelps is a 30 y o  female with significant PMH for Obesity who presents with L knee injury, leg injury  Vitals signs reviewed   Physical examination remarkable for TTP diffusely of L knee with swelling, decreased ROM secondary to pain though FROM passively, pain with anterior drawer, lachman's  Initial Ddx  includes but is not limited to:   arthritis, bursitis, tendinitis, sprain, strain, fracture, meniscal tear, cellulitis, osteomyelitis, gout, Lyme disease, Baker's cyst, rheumatologic process; doubt septic arthritis or DVT or arterial occlusion  Initial ED plan:   Plan will be to perform diagnostic testing of XR ordered prior to my eval by nurse triage, able to evaluate knee and treat symptomatically with analgesia, knee immobilizer with suspicion of  Ligamentous injury    Final ED summary/disposition: Discussed results of diagnostic testing with pt and  in detail  Home care recommendations given with discharge paperwork  Return to ED instructions given if new/worsening sxs  MDM  Reviewed: previous chart, nursing note and vitals  Interpretation: x-ray        ED Course of Care and Re-Assessments                            Medications   ibuprofen (MOTRIN) tablet 600 mg (600 mg Oral Given 8/20/19 0035)         FINAL IMPRESSION    Final diagnoses:   Injury of left knee, initial encounter         DISPOSITION/PLAN  Time reflects when diagnosis was documented in both MDM as applicable and the Disposition within this note     Time User Action Codes Description Comment    8/20/2019  1:31 AM Teri Kwok Add [P29 02TP] Injury of left knee, initial encounter       ED Disposition     ED Disposition Condition Date/Time Comment    Discharge Stable Tue Aug 20, 2019  1:31 AM Patience Chun discharge to home/self care              Follow-up Information     Follow up With Specialties Details Why Contact Info Additional Information    Serina Thompson PA-C Family Medicine, Physician Assistant Go to  For follow up [address]  [facility] [phone]       [place] [address] Emergency Department Emergency Medicine Go to  If symptoms worsen [address]  AN ED, [address], [place], [place], [address] [facility] Orthopedic Surgery Schedule an appointment as soon as possible for a visit  For follow up [address] [place] [address] [place], [address], [address] [place], [place], [address]              PATIENT REFERRED TO:    Elba Mclean PA-C  [address]  [address]  [phone]    Go to   For follow up    [address] [address]  [phone]  Go to   If symptoms worsen    [address] [place]  [address] [facility] [address] [address] [phone]  Schedule an appointment as soon as possible for a visit   For follow up      DISCHARGE MEDICATIONS:    Discharge Medication List as of 8/20/2019  1:35 AM      START taking these medications    Details   capsicum (ZOSTRIX) 0 075 % topical cream Apply topically 3 (three) times a day, Starting Tue 8/20/2019, Print      ibuprofen (MOTRIN) 600 mg tablet Take 1 tablet (600 mg total) by mouth every 8 (eight) hours as needed for mild pain, Starting Tue 8/20/2019, Print         CONTINUE these medications which have NOT CHANGED    Details   fluticasone (FLONASE) 50 mcg/act nasal spray 2 sprays into each nostril daily, Starting Tue 6/11/2019, Normal      SUMAtriptan (IMITREX) 25 mg tablet Take 1 tablet (25 mg total) by mouth once as needed for migraine for up to 1 dose, Starting Fri 1/11/2019, Normal             No discharge procedures on file           PADMINI Schafer PA-C  08/22/19 [id number]

## 2019-08-27 ENCOUNTER — OFFICE VISIT (OUTPATIENT)
Dept: OBGYN CLINIC | Facility: CLINIC | Age: 29
End: 2019-08-27
Payer: COMMERCIAL

## 2019-08-27 VITALS
DIASTOLIC BLOOD PRESSURE: 71 MMHG | HEIGHT: 59 IN | SYSTOLIC BLOOD PRESSURE: 113 MMHG | HEART RATE: 94 BPM | WEIGHT: 158 LBS | BODY MASS INDEX: 31.85 KG/M2

## 2019-08-27 DIAGNOSIS — S83.512A COMPLETE TEAR OF ANTERIOR CRUCIATE LIGAMENT OF LEFT KNEE, INITIAL ENCOUNTER: Primary | ICD-10-CM

## 2019-08-27 PROCEDURE — 99213 OFFICE O/P EST LOW 20 MIN: CPT | Performed by: ORTHOPAEDIC SURGERY

## 2019-08-27 NOTE — PROGRESS NOTES
Patient Name:  Ruth Ann Roldan  MRN:  [id number]    Assessment & Plan     Complete tear of left ACL  1  Patient and I discussed conservative treatment as well as surgical intervention today in the office  2  At this point in time would like her to begin working on her range of motion  A referral to formal physical therapy was placed today in the office  3  Patient was advised she may discontinue use of brace  She may continue to use crutches and weightbear as tolerated  4  Follow-up in 4 weeks for re-evaluation discussed potential surgical intervention      Chief Complaint     Left knee pain    History of the Present Illness     Xochitl Franco is a 30 y o  female presents the office for evaluation of her left knee  She was referred to me by Dr Florentino Mcfarland regard to her left knee injury  Patient states on 08/20/2019 to jumping on the trampoline with her kids which she felt a pop to her left knee  Patient states she experienced immediate swelling and pain about the knee  She was seen in the ED later that afternoon where x-rays were obtained  She previously saw Dr Florentino Mcfarland who obtained an MRI and placed her in a knee brace as well as on crutches for nonweightbearing  Patient states her pain has somewhat subsided since the initial injury  She notes her swelling is subsiding  She is not place weight on her left leg  She states she is taking Tylenol and ibuprofen to help control her pain  She notes no previous injury to her left knee  She denies any numbness tingling today  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  She is inquiring about treatment options today  Physical Exam     /71   Pulse 94   Ht 4' 11" (1 499 m)   Wt 71 7 kg (158 lb)   BMI 31 91 kg/m²     Left  knee:  Soft tissue swelling:   Moderate  Effusion:  Moderate  Tenderness to palpation: None  Range of motion:  Extension:  -10° limited by pain  Flexion:  60° limited by pain  Lachman test:  Positive  Valgus stress: Stable  Varus stress: Stable  Posterior drawer test: Stable  Nahun's test: Negative  Patellar grind test: Negative    Eyes: Anicteric sclerae  Neck: Supple  Lungs: Unlabored breathing  Cardiovascular: Capillary refill is less than 2 seconds  Skin: Intact without erythema  Neurologic: Sensation intact to light touch  Psychiatric: Mood and affect are appropriate  Data Review     I have personally reviewed pertinent films in PACS, and my interpretation follows:    MRI of left knee obtained on 2019 demonstrates complete tear of ACL as well as a bone contusion to the medial tibial plateau  Past Medical History:   Diagnosis Date    Allergic rhinitis 2010    Anemia     Gestational diabetes     Hyperemesis gravidarum, antepartum     Lump in neck 2011    Palpitations 2011    Paronychia of finger     Viral gastroenteritis        Past Surgical History:   Procedure Laterality Date    APPENDECTOMY       SECTION      x2    TUBAL LIGATION         Allergies   Allergen Reactions    Vicodin [Hydrocodone-Acetaminophen] Anaphylaxis     No epi pen    Amoxicillin Hives     No epi pen    Zyrtec [Cetirizine] Hives     No epi pen       Current Outpatient Medications on File Prior to Visit   Medication Sig Dispense Refill    capsicum (ZOSTRIX) 0 075 % topical cream Apply topically 3 (three) times a day (Patient not taking: Reported on 2019) 28 3 g 0    fluticasone (FLONASE) 50 mcg/act nasal spray 2 sprays into each nostril daily (Patient not taking: Reported on 2019) 1 Bottle 0    ibuprofen (MOTRIN) 600 mg tablet Take 1 tablet (600 mg total) by mouth every 8 (eight) hours as needed for mild pain 15 tablet 0    SUMAtriptan (IMITREX) 25 mg tablet Take 1 tablet (25 mg total) by mouth once as needed for migraine for up to 1 dose (Patient not taking: Reported on 2019) 6 tablet 0     No current facility-administered medications on file prior to visit  Social History     Tobacco Use    Smoking status: Never Smoker    Smokeless tobacco: Never Used    Tobacco comment: Former smoker per [employer]   Substance Use Topics    Alcohol use: No    Drug use: No       Family History   Problem Relation Age of Onset    Depression Mother     Diabetes Mother     Depression Family     Diabetes Father        Review of Systems     As stated in the HPI  All other systems were reviewed and are negative        Scribe Attestation    I,:   Fuentes Olmedo MA am acting as a scribe while in the presence of the attending physician :        I,:   Kasia Jones MD personally performed the services described in this documentation    as scribed in my presence :

## 2019-09-09 ENCOUNTER — EVALUATION (OUTPATIENT)
Dept: PHYSICAL THERAPY | Facility: CLINIC | Age: 29
End: 2019-09-09
Payer: COMMERCIAL

## 2019-09-09 DIAGNOSIS — S83.512A COMPLETE TEAR OF ANTERIOR CRUCIATE LIGAMENT OF LEFT KNEE, INITIAL ENCOUNTER: ICD-10-CM

## 2019-09-09 PROCEDURE — 97161 PT EVAL LOW COMPLEX 20 MIN: CPT | Performed by: PHYSICAL THERAPIST

## 2019-09-09 PROCEDURE — 97110 THERAPEUTIC EXERCISES: CPT | Performed by: PHYSICAL THERAPIST

## 2019-09-09 NOTE — PROGRESS NOTES
PT Evaluation     Today's date: 2019  Patient name: Delaney Long  : 1990  MRN: [id number]  Referring provider: Cain Deutsch MD  Dx:   Encounter Diagnosis     ICD-10-CM    1  Complete tear of anterior cruciate ligament of left knee, initial encounter S83 512A Ambulatory referral to Physical Therapy                  Assessment  Assessment details: Delaney Long is a 30 y o  female who presents with pain, decreased strength, decreased ROM, and ambulatory dysfunction  Due to these impairments, patient has difficulty performing a/iadls and recreational activities  Patient's clinical presentation is consistent with their referring diagnosis of complete tear of anterior cruciate ligament of left knee  Patient would benefit from skilled physical therapy to address their aforementioned impairments, improve their level of function and to improve their overall quality of life  Impairments: abnormal gait, abnormal or restricted ROM, impaired physical strength and pain with function    Symptom irritability: moderateUnderstanding of Dx/Px/POC: good   Prognosis: good    Goals  Short term goals  to be achieved in 4 weeks:     Decrease pain 20-50%  Increase strength by 1/2 grade  Improve range of motion by 25%  Long term goals  to be achieved by discharge:    Ambulation is improved to maximal level of function  Squatting is improved to maximal level of function  Stair climbing is improved to maximal level of function  IADL performance in related activities is improved to maximal level of function  Performance in related household activities is improved to maximal level of function       Plan  Planned therapy interventions: manual therapy, neuromuscular re-education, patient education, strengthening, stretching, therapeutic activities, therapeutic exercise and home exercise program  Frequency: 2x week  Duration in visits: 12  Duration in weeks: 6  Plan of Care beginning date: 2019  Plan of Care expiration date: 10/21/2019  Treatment plan discussed with: patient        Subjective Evaluation    History of Present Illness  Mechanism of injury: Patient states she was jumping on a trampoline with her kids on 19 when she felt a snap in her left knee  Patient received MRI of her left knee on 19 which revealed complete rupture of ACL of the left knee  Patient is currently ambulating NWB on her left LE with axillary crutches; patient states she is using hinged brace on her left knee when ambulating outside of her home  Patient states intermittent tingling in the dorsum of her left foot  Patient states she is unable to place weight on her left LE during ambulation secondary to pain     Pain  Current pain ratin  At best pain ratin  At worst pain ratin  Quality: sharp and dull ache  Relieving factors: medications    Patient Goals  Patient goals for therapy: decreased pain, increased strength, independence with ADLs/IADLs and increased motion          Objective     Active Range of Motion   Left Knee   Flexion: 45 degrees   Extension: -15 degrees     Passive Range of Motion   Left Knee   Flexion: 60 degrees   Extension: -10 degrees     Strength/Myotome Testing     Left Hip   Planes of Motion   Flexion: 3 (pain)    Left Knee   Flexion: 3- (pain)  Extension: 3+ (pain)  Quadriceps contraction: fair    Swelling     Left Knee Girth Measurement (cm)   Joint line: 36 5 cm    Right Knee Girth Measurement (cm)   Joint line: 34 5 cm    Ambulation     Comments   Patient able to ambulate WBAT on left LE with axillary crutches with step to gait pattern      Flowsheet Rows      Most Recent Value   PT/OT G-Codes   Current Score  24   Projected Score  55   FOTO information reviewed  Yes   Assessment Type  Evaluation             Precautions: Left ACL rupture      Manual              Left knee flex/ext PROM                                                                     Exercise Diary   Bike - ROM             Mini squats             Stand knee flex             Heel slides HEP            Quad sets HEP            Gastroc strap stretch HEP            SLR flex HEP            SLR abd             Iso hip add             TB ankle x 4                                                                                                                                                   Modalities              CP prn

## 2019-09-10 NOTE — PROGRESS NOTES
Daily Note     Today's date: 2019  Patient name: Erma Mckinney  : 1990  MRN: [id number]  Referring provider: Marylee Spence, MD  Dx:   Encounter Diagnosis     ICD-10-CM    1  Complete tear of anterior cruciate ligament of left knee, initial encounter S83 512A                    Subjective: She states her flexibility is improving a bit as she is able to put more weight in it  Objective: See treatment diary below      Assessment: Tolerated treatment well  Patient would benefit from continued PT  Bike seemed to really help ROM  After PROM and stretching she felt improved flexibility  WB tolerance only to #30  Plan: Continue per plan of care        Precautions: Left ACL rupture 19      Manual             Left knee flex/ext PROM  10'                                                                   Exercise Diary             Bike - ROM  4' incr nv           Mini squats             Stand knee flex  2x19           Heel slides HEP 3'           Quad sets HEP 20x           Gastroc strap stretch HEP 3x :20           SLR flex HEP 2x10           SLR abd  2x10           Iso hip add             TB ankle x 4  nv           Ankle alphabets  1x           WS on scale  #30x10                                                                                                                   Modalities             CP and elevation  10'

## 2019-09-11 ENCOUNTER — OFFICE VISIT (OUTPATIENT)
Dept: PHYSICAL THERAPY | Facility: CLINIC | Age: 29
End: 2019-09-11
Payer: COMMERCIAL

## 2019-09-11 DIAGNOSIS — S83.512A COMPLETE TEAR OF ANTERIOR CRUCIATE LIGAMENT OF LEFT KNEE, INITIAL ENCOUNTER: Primary | ICD-10-CM

## 2019-09-11 PROCEDURE — 97140 MANUAL THERAPY 1/> REGIONS: CPT | Performed by: PHYSICAL THERAPIST

## 2019-09-11 PROCEDURE — 97110 THERAPEUTIC EXERCISES: CPT | Performed by: PHYSICAL THERAPIST

## 2019-09-11 PROCEDURE — 97112 NEUROMUSCULAR REEDUCATION: CPT | Performed by: PHYSICAL THERAPIST

## 2019-09-13 ENCOUNTER — TELEPHONE (OUTPATIENT)
Dept: OBGYN CLINIC | Facility: HOSPITAL | Age: 29
End: 2019-09-13

## 2019-09-13 NOTE — TELEPHONE ENCOUNTER
Patient is calling stating that she started PT on Wednesday and that night she started to fall, but caught herself and felt a crack in the back of her knee  Patient states her knee has been sore ever since  Please advise

## 2019-09-13 NOTE — TELEPHONE ENCOUNTER
Please contact the patient  If she is having pain and inability to bear weight we can have her follow up next week  If not she can try rest ice elevation and over-the-counter pain medicines    Try going back to physical therapy next week if she is still increased or severe pain with this then we will bring her  Back in sooner for re-evaluation

## 2019-09-16 ENCOUNTER — OFFICE VISIT (OUTPATIENT)
Dept: FAMILY MEDICINE CLINIC | Facility: CLINIC | Age: 29
End: 2019-09-16
Payer: COMMERCIAL

## 2019-09-16 VITALS
OXYGEN SATURATION: 98 % | BODY MASS INDEX: 31.91 KG/M2 | HEIGHT: 59 IN | SYSTOLIC BLOOD PRESSURE: 120 MMHG | DIASTOLIC BLOOD PRESSURE: 78 MMHG | RESPIRATION RATE: 16 BRPM | HEART RATE: 92 BPM

## 2019-09-16 DIAGNOSIS — S83.512A COMPLETE TEAR OF ANTERIOR CRUCIATE LIGAMENT OF LEFT KNEE, INITIAL ENCOUNTER: ICD-10-CM

## 2019-09-16 DIAGNOSIS — Z00.00 HEALTHCARE MAINTENANCE: Primary | ICD-10-CM

## 2019-09-16 PROBLEM — H65.02 ACUTE SEROUS OTITIS MEDIA OF LEFT EAR: Status: RESOLVED | Noted: 2017-11-01 | Resolved: 2019-09-16

## 2019-09-16 PROCEDURE — 99395 PREV VISIT EST AGE 18-39: CPT | Performed by: NURSE PRACTITIONER

## 2019-09-16 RX ORDER — CLINDAMYCIN HYDROCHLORIDE 300 MG/1
CAPSULE ORAL
Refills: 0 | COMMUNITY
Start: 2019-09-07 | End: 2020-01-14

## 2019-09-16 RX ORDER — IBUPROFEN 200 MG
200 TABLET ORAL EVERY 6 HOURS PRN
COMMUNITY
End: 2020-01-14

## 2019-09-16 NOTE — PROGRESS NOTES
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  Patient refuses routine lab work  Patient refuses the influenza vaccine  Physical form filled out for school  Patient instructed to follow-up in 1 year for a wellness visit or sooner prn  Subjective:     Patient ID: Arlin Shankar is a 30 y o  female  Patient is here for a physical exam and wellness visit  Patient reports that she needs a physical exam for school  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 [employer]   Patient reports that she tore her left ACL jumping on the trampoline  Patient reports that she is using crutches  Patient reports that she follows up with ortho and started physical therapy  Patient reports that she eats a healthy diet  Patient reports that prior to her torn ACL, she was occasionally walking for exercise  Patient reports that she has an appointment with a dentist tomorrow  Patient reports that she sees the dentist at least once a year  Patient reports that she wears glasses  Patient reports that she follows up with the eye doctor  LMP was 9/1/19  Patient reports that she follows up with the GYN  Patient reports that she sleeps about 7-8 hours at night  Denies any bowel or bladder issues  Review of Systems   Constitutional: Negative for appetite change, chills, fatigue and fever  HENT: Negative for congestion, ear pain, sore throat and trouble swallowing      Eyes: Negative for pain, discharge and redness  Respiratory: Negative for cough, chest tightness, shortness of breath and wheezing  Cardiovascular: Negative for chest pain, palpitations and leg swelling  Gastrointestinal: Negative for abdominal pain, blood in stool, constipation, diarrhea and vomiting  Genitourinary: Negative for dysuria, frequency, hematuria, pelvic pain and urgency  Musculoskeletal:        As noted in HPI  Skin: Negative for rash  Neurological: Negative for dizziness, seizures, syncope, weakness, light-headedness, numbness and headaches  Psychiatric/Behavioral: Negative for suicidal ideas  Denies any depression  Objective:     Physical Exam   Constitutional: She is oriented to person, place, and time  No distress  HENT:   Right Ear: External ear normal    Left Ear: External ear normal    Nose: Nose normal    Mouth/Throat: Oropharynx is clear and moist    Tympanic membranes and ear canals wnl  Posterior pharynx wnl  Eyes: Pupils are equal, round, and reactive to light  Conjunctivae are normal    Neck: No thyromegaly present  Cardiovascular: Normal rate, regular rhythm, normal heart sounds and intact distal pulses  Pulmonary/Chest: Effort normal and breath sounds normal  She has no wheezes  Abdominal: Soft  She exhibits no distension and no mass  There is no tenderness  Musculoskeletal:   Patient is using crutches to walk  Lymphadenopathy:     She has no cervical adenopathy  Neurological: She is alert and oriented to person, place, and time  No cranial nerve deficit  Skin: No rash noted  Psychiatric: She has a normal mood and affect  Vitals reviewed

## 2019-09-17 ENCOUNTER — TELEPHONE (OUTPATIENT)
Dept: OBGYN CLINIC | Facility: HOSPITAL | Age: 29
End: 2019-09-17

## 2019-09-17 NOTE — TELEPHONE ENCOUNTER
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

## 2019-09-17 NOTE — TELEPHONE ENCOUNTER
I called James Nuñez back and left a message on her voice mail letting her know that the requested note is completed and may be picked up at the office

## 2019-09-18 ENCOUNTER — OFFICE VISIT (OUTPATIENT)
Dept: PHYSICAL THERAPY | Facility: CLINIC | Age: 29
End: 2019-09-18
Payer: COMMERCIAL

## 2019-09-18 DIAGNOSIS — S83.512A COMPLETE TEAR OF ANTERIOR CRUCIATE LIGAMENT OF LEFT KNEE, INITIAL ENCOUNTER: Primary | ICD-10-CM

## 2019-09-18 PROCEDURE — 97112 NEUROMUSCULAR REEDUCATION: CPT

## 2019-09-18 PROCEDURE — 97110 THERAPEUTIC EXERCISES: CPT

## 2019-09-18 PROCEDURE — 97140 MANUAL THERAPY 1/> REGIONS: CPT

## 2019-09-23 ENCOUNTER — OFFICE VISIT (OUTPATIENT)
Dept: PHYSICAL THERAPY | Facility: CLINIC | Age: 29
End: 2019-09-23
Payer: COMMERCIAL

## 2019-09-23 DIAGNOSIS — S83.512A COMPLETE TEAR OF ANTERIOR CRUCIATE LIGAMENT OF LEFT KNEE, INITIAL ENCOUNTER: Primary | ICD-10-CM

## 2019-09-23 PROCEDURE — 97110 THERAPEUTIC EXERCISES: CPT | Performed by: PHYSICAL THERAPIST

## 2019-09-23 PROCEDURE — 97112 NEUROMUSCULAR REEDUCATION: CPT | Performed by: PHYSICAL THERAPIST

## 2019-09-23 PROCEDURE — 97140 MANUAL THERAPY 1/> REGIONS: CPT | Performed by: PHYSICAL THERAPIST

## 2019-09-23 NOTE — PROGRESS NOTES
Daily Note     Today's date: 2019  Patient name: Francisco Hussein  : 1990  MRN: [id number]  Referring provider: Chris Parekh MD  Dx:   Encounter Diagnosis     ICD-10-CM    1  Complete tear of anterior cruciate ligament of left knee, initial encounter S83 512A                    Subjective: Pt states she states she took a few steps s crutches and had no pain but it was unsteady  Objective: See treatment diary below  AROM 15-97  PROM ext 11    Assessment: When WS her knee did give out a bit  She is still lacking quite a bit of extension  Prone hangs given  Worked on [employer] as well as she was unable to get end range of motion  Plan: Continue per plan of care        Precautions: Left ACL rupture 19      Manual           Left knee flex/ext PROM  10' 10'  10'                                                                 Exercise Diary           Bike - ROM  4' incr nv 6' 6'         Mini squats             Stand knee flex  2x19 2 x 19  2x10         Heel slides HEP 3' 5" x 20  20x :05         Quad sets HEP 20x 20x           Gastroc strap stretch HEP 3x :20 3  X20"  3x :20         SLR flex HEP 2x10 2 x10  2x10         SLR abd  2x10 2 x10  2x10         Iso hip add             TB ankle x 4  nv GTB 10x ea GTB 10x         Ankle alphabets  1x 1 x           WS on scale  #30x10 403 x 8  #58 10x         Bridges    2x10         SAQ    2x10         Prone knee hangs #1 5    5 min                                                                          Modalities             CP and elevation  10' home

## 2019-09-25 ENCOUNTER — OFFICE VISIT (OUTPATIENT)
Dept: PHYSICAL THERAPY | Facility: CLINIC | Age: 29
End: 2019-09-25
Payer: COMMERCIAL

## 2019-09-25 DIAGNOSIS — S83.512A COMPLETE TEAR OF ANTERIOR CRUCIATE LIGAMENT OF LEFT KNEE, INITIAL ENCOUNTER: Primary | ICD-10-CM

## 2019-09-25 PROCEDURE — 97140 MANUAL THERAPY 1/> REGIONS: CPT | Performed by: PHYSICAL THERAPIST

## 2019-09-25 PROCEDURE — 97112 NEUROMUSCULAR REEDUCATION: CPT | Performed by: PHYSICAL THERAPIST

## 2019-09-25 PROCEDURE — 97110 THERAPEUTIC EXERCISES: CPT | Performed by: PHYSICAL THERAPIST

## 2019-09-25 PROCEDURE — 97530 THERAPEUTIC ACTIVITIES: CPT | Performed by: PHYSICAL THERAPIST
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
## 2019-09-30 ENCOUNTER — OFFICE VISIT (OUTPATIENT)
Dept: PHYSICAL THERAPY | Facility: CLINIC | Age: 29
End: 2019-09-30
Payer: COMMERCIAL

## 2019-09-30 DIAGNOSIS — S83.512A COMPLETE TEAR OF ANTERIOR CRUCIATE LIGAMENT OF LEFT KNEE, INITIAL ENCOUNTER: Primary | ICD-10-CM

## 2019-09-30 PROCEDURE — 97110 THERAPEUTIC EXERCISES: CPT | Performed by: PHYSICAL THERAPIST

## 2019-09-30 PROCEDURE — 97112 NEUROMUSCULAR REEDUCATION: CPT | Performed by: PHYSICAL THERAPIST

## 2019-09-30 PROCEDURE — 97140 MANUAL THERAPY 1/> REGIONS: CPT | Performed by: PHYSICAL THERAPIST
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
## 2019-10-01 ENCOUNTER — OFFICE VISIT (OUTPATIENT)
Dept: OBGYN CLINIC | Facility: CLINIC | Age: 29
End: 2019-10-01
Payer: COMMERCIAL

## 2019-10-01 VITALS
HEIGHT: 59 IN | HEART RATE: 86 BPM | SYSTOLIC BLOOD PRESSURE: 137 MMHG | BODY MASS INDEX: 31.91 KG/M2 | DIASTOLIC BLOOD PRESSURE: 78 MMHG

## 2019-10-01 DIAGNOSIS — S83.512D COMPLETE TEAR OF ANTERIOR CRUCIATE LIGAMENT OF LEFT KNEE, SUBSEQUENT ENCOUNTER: Primary | ICD-10-CM

## 2019-10-01 PROCEDURE — 99213 OFFICE O/P EST LOW 20 MIN: CPT | Performed by: ORTHOPAEDIC SURGERY

## 2019-10-01 NOTE — PROGRESS NOTES
Patient Name:  Arleen Pitt  MRN:  [id number]    Assessment & Plan     Complete tear of left ACL  1  Continue with formal physical therapy and transition to home exercise program  2  Partial weightbear as tolerated  3  Follow-up in 6 weeks to discuss possible surgical intervention      Subjective     Patient is a 25-year-old female who presents today for follow-up evaluation of her left knee  Patient states she has been attending physical therapy and feels like she is making slow progress  She is continues to partial weightbear with crutches  She denies any new injuries  She denies any numbness and tingling  General ROS:  Negative for fever or chills  Neurological ROS:  Negative for numbness or tingling  Objective     /78   Pulse 86   Ht 4' 11" (1 499 m)   LMP 09/01/2019   BMI 31 91 kg/m²     Left  knee:  Soft tissue swelling: None  Effusion: mild   Tenderness to palpation: None  Range of motion:  Extension: 0  Flexion: 100  Lachman test: positive   Valgus stress: Stable  Varus stress: Stable  Posterior drawer test: Stable  Nahun's test: Negative  Patellar grind test: Negative  Psychiatric: Mood and affect are appropriate  Data Review     I have personally reviewed pertinent films in PACS, and my interpretation follows      No new images to review       Social History     Tobacco Use    Smoking status: Never Smoker    Smokeless tobacco: Never Used    Tobacco comment: Former smoker per [employer]   Substance Use Topics    Alcohol use: No    Drug use: No       Scribe Attestation    I,:   Rut Cardoza MA am acting as a scribe while in the presence of the attending physician :        I,:   Katherine Parikh MD personally performed the services described in this documentation    as scribed in my presence :

## 2019-10-17 ENCOUNTER — TELEPHONE (OUTPATIENT)
Dept: FAMILY MEDICINE CLINIC | Facility: CLINIC | Age: 29
End: 2019-10-17

## 2019-10-17 DIAGNOSIS — Z13.6 ENCOUNTER FOR SCREENING FOR CARDIOVASCULAR DISORDERS: ICD-10-CM

## 2019-10-17 DIAGNOSIS — Z83.3 FAMILY HISTORY OF DIABETES MELLITUS IN FATHER: ICD-10-CM

## 2019-10-17 DIAGNOSIS — Z00.00 HEALTHCARE MAINTENANCE: Primary | ICD-10-CM

## 2019-10-17 NOTE — TELEPHONE ENCOUNTER
Pt was here in September for a physical and was offered labs but did not want them at the time  Her dad just found out he has Diabetes so she would like routine labs   I did tell her Radha Hernandezke is not back until Monday

## 2019-10-18 NOTE — TELEPHONE ENCOUNTER
Routine lab work ordered  Please tell patient to check with insurance for coverage before getting lab work done

## 2019-10-20 LAB
ALBUMIN SERPL-MCNC: 4.3 G/DL (ref 3.6–5.1)
ALBUMIN/GLOB SERPL: 1.5 (CALC) (ref 1–2.5)
ALP SERPL-CCNC: 79 U/L (ref 33–115)
ALT SERPL-CCNC: 26 U/L (ref 6–29)
AST SERPL-CCNC: 18 U/L (ref 10–30)
BILIRUB SERPL-MCNC: 0.5 MG/DL (ref 0.2–1.2)
BUN SERPL-MCNC: 13 MG/DL (ref 7–25)
BUN/CREAT SERPL: NORMAL (CALC) (ref 6–22)
CALCIUM SERPL-MCNC: 9.4 MG/DL (ref 8.6–10.2)
CHLORIDE SERPL-SCNC: 102 MMOL/L (ref 98–110)
CHOLEST SERPL-MCNC: 193 MG/DL
CHOLEST/HDLC SERPL: 3.3 (CALC)
CO2 SERPL-SCNC: 26 MMOL/L (ref 20–32)
CREAT SERPL-MCNC: 0.74 MG/DL (ref 0.5–1.1)
GLOBULIN SER CALC-MCNC: 2.9 G/DL (CALC) (ref 1.9–3.7)
GLUCOSE SERPL-MCNC: 75 MG/DL (ref 65–99)
HDLC SERPL-MCNC: 59 MG/DL
LDLC SERPL CALC-MCNC: 114 MG/DL (CALC)
NONHDLC SERPL-MCNC: 134 MG/DL (CALC)
POTASSIUM SERPL-SCNC: 4.3 MMOL/L (ref 3.5–5.3)
PROT SERPL-MCNC: 7.2 G/DL (ref 6.1–8.1)
SL AMB EGFR AFRICAN AMERICAN: 128 ML/MIN/1.73M2
SL AMB EGFR NON AFRICAN AMERICAN: 110 ML/MIN/1.73M2
SODIUM SERPL-SCNC: 135 MMOL/L (ref 135–146)
TRIGL SERPL-MCNC: 101 MG/DL
TSH SERPL-ACNC: 1.43 MIU/L

## 2019-11-12 ENCOUNTER — OFFICE VISIT (OUTPATIENT)
Dept: OBGYN CLINIC | Facility: CLINIC | Age: 29
End: 2019-11-12
Payer: COMMERCIAL

## 2019-11-12 VITALS
SYSTOLIC BLOOD PRESSURE: 130 MMHG | WEIGHT: 162 LBS | HEART RATE: 82 BPM | DIASTOLIC BLOOD PRESSURE: 80 MMHG | BODY MASS INDEX: 32.66 KG/M2 | HEIGHT: 59 IN

## 2019-11-12 DIAGNOSIS — S83.512D COMPLETE TEAR OF ANTERIOR CRUCIATE LIGAMENT OF LEFT KNEE, SUBSEQUENT ENCOUNTER: Primary | ICD-10-CM

## 2019-11-12 PROCEDURE — 99213 OFFICE O/P EST LOW 20 MIN: CPT | Performed by: ORTHOPAEDIC SURGERY

## 2019-11-12 NOTE — PROGRESS NOTES
Patient Name:  Janak Romero  MRN:  [id number]    Assessment & Plan     Complete tear of left ACL  1  Left ACL repair was discussed at length today   2  Cristi Donaldson will give the office a call to proceed with surgical intervention once she figures out her work/school schedule   3  Follow up in the office to sign surgical consent       Subjective     30 y o  female presents to the office today for a follow up regarding a complete tear of her left ACL  Cristi Donaldson has been attending PT  Overall Xochitl notes improvement with PT  She does note that her left knee does give out on her occassionally  She also notes intermittent pain  She states her pain is worse with prolonged activities and is better at rest       General ROS:  Negative for fever or chills  Neurological ROS:  Negative for numbness or tingling  Objective     /80   Pulse 82   Ht 4' 11" (1 499 m)   Wt 73 5 kg (162 lb)   BMI 32 72 kg/m²     Left  knee:  Soft tissue swelling: None  Effusion: Small  Tenderness to palpation: None  Range of motion:  Extension: 0  Flexion: 130  Lachman test: 2+ laxity  Valgus stress: Stable  Varus stress: Stable  Posterior drawer test: Stable  Nahun's test: Negative  Patellar grind test: Negative  Psychiatric: Mood and affect are appropriate        Social History     Tobacco Use    Smoking status: Never Smoker    Smokeless tobacco: Never Used    Tobacco comment: Former smoker per [employer]   Substance Use Topics    Alcohol use: No    Drug use: No       Scribe Attestation    I,:   Mary Mcarthur am acting as a scribe while in the presence of the attending physician :        I,:   Vivek Nguyen MD personally performed the services described in this documentation    as scribed in my presence :

## 2019-12-06 DIAGNOSIS — B37.3 YEAST VAGINITIS: Primary | ICD-10-CM

## 2019-12-06 RX ORDER — FLUCONAZOLE 150 MG/1
150 TABLET ORAL EVERY OTHER DAY
Qty: 2 TABLET | Refills: 0 | Status: SHIPPED | OUTPATIENT
Start: 2019-12-06 | End: 2019-12-09

## 2019-12-06 NOTE — PROGRESS NOTES
Discharge Note  Xochitl BLAYNE Chun has made minimal functional progress with physical therapy  she was educated and updated in her home exercise program  Jo-Ann Foote will be discharged from formal therapy due to plateau of progress c PT but will follow up as needed

## 2020-01-14 ENCOUNTER — OFFICE VISIT (OUTPATIENT)
Dept: OBGYN CLINIC | Facility: CLINIC | Age: 30
End: 2020-01-14
Payer: COMMERCIAL

## 2020-01-14 VITALS
HEIGHT: 59 IN | BODY MASS INDEX: 32.66 KG/M2 | DIASTOLIC BLOOD PRESSURE: 78 MMHG | WEIGHT: 162 LBS | HEART RATE: 80 BPM | SYSTOLIC BLOOD PRESSURE: 128 MMHG

## 2020-01-14 DIAGNOSIS — S83.512D COMPLETE TEAR OF ANTERIOR CRUCIATE LIGAMENT OF LEFT KNEE, SUBSEQUENT ENCOUNTER: Primary | ICD-10-CM

## 2020-01-14 PROCEDURE — 99214 OFFICE O/P EST MOD 30 MIN: CPT | Performed by: ORTHOPAEDIC SURGERY

## 2020-01-14 RX ORDER — CHLORHEXIDINE GLUCONATE 4 G/100ML
SOLUTION TOPICAL DAILY PRN
Status: CANCELLED | OUTPATIENT
Start: 2020-01-14

## 2020-01-14 NOTE — PROGRESS NOTES
Patient Name:  Keila Bergeron  MRN:  [id number]    Assessment & Plan     Left Knee, Complete tear of ACL    Patient will be scheduled appropriately today for a left knee arthroscopic ACL reconstruction with hamstring autograft  The risks and benefits of the procedure as well as the pre and post operative expectations were explained in detail to the patient today  She understood and all questions were answered to the best of my ability    Follow up 10-14 days post operatively    Subjective     78-year-old female presents to the office today for a follow-up visit for her left knee  She has a known complete tear of her ACL in that left knee  She has been attending PT and supplementing with home exercise program with minimal improvement  Patient notes that she continues to experience intermittent instability episodes about the knee especially with descending stairs and walking up an incline  She is wishing to proceed forward with a surgical procedure to repair the ACL due to these instability episodes in the effects on her daily activities  General ROS:  Negative for fever or chills  Neurological ROS:  Negative for numbness or tingling  Objective     /78   Pulse 80   Ht 4' 11" (1 499 m)   Wt 73 5 kg (162 lb)   BMI 32 72 kg/m²     Left  knee:  Soft tissue swelling: None  Effusion: Small  Tenderness to palpation: None  Range of motion:  Extension: 0  Flexion: 130  Lachman test: 2+ laxity  Valgus stress: Stable  Varus stress: Stable  Posterior drawer test: Stable  Nahun's test: Negative  Patellar grind test: Negative    Eyes: No scleral icterus  Neck: Supple  Lungs: Normal respiratory effort  Cardiovascular: Capillary refill is less than 2 seconds  Skin: Intact without erythema  Neurologic: Sensation intact to light touch  Psychiatric: Mood and affect are appropriate  Data Review     I have personally reviewed pertinent films in PACS, and my interpretation follows      MRI Left Knee 8/22/2019: Complete midsubstance tear of the ACL  Small contusion at the posterior aspect of the medial tibial plateau      Social History     Tobacco Use    Smoking status: Never Smoker    Smokeless tobacco: Never Used    Tobacco comment: Former smoker per [employer]   Substance Use Topics    Alcohol use: No    Drug use: No       Scribe Attestation    I,:   Ele Soriano am acting as a scribe while in the presence of the attending physician :        I,:   Patti Gage MD personally performed the services described in this documentation    as scribed in my presence :

## 2020-01-28 DIAGNOSIS — B37.3 YEAST VAGINITIS: Primary | ICD-10-CM

## 2020-01-29 RX ORDER — FLUCONAZOLE 150 MG/1
150 TABLET ORAL EVERY OTHER DAY
Qty: 2 TABLET | Refills: 0 | Status: SHIPPED | OUTPATIENT
Start: 2020-01-29 | End: 2020-02-01

## 2020-02-04 ENCOUNTER — OFFICE VISIT (OUTPATIENT)
Dept: FAMILY MEDICINE CLINIC | Facility: CLINIC | Age: 30
End: 2020-02-04
Payer: COMMERCIAL

## 2020-02-04 VITALS
HEIGHT: 59 IN | BODY MASS INDEX: 33.71 KG/M2 | HEART RATE: 101 BPM | OXYGEN SATURATION: 97 % | DIASTOLIC BLOOD PRESSURE: 78 MMHG | SYSTOLIC BLOOD PRESSURE: 118 MMHG | WEIGHT: 167.2 LBS | RESPIRATION RATE: 18 BRPM | TEMPERATURE: 98.7 F

## 2020-02-04 DIAGNOSIS — J11.1 INFLUENZA: Primary | ICD-10-CM

## 2020-02-04 PROCEDURE — 99213 OFFICE O/P EST LOW 20 MIN: CPT | Performed by: PHYSICIAN ASSISTANT

## 2020-02-04 PROCEDURE — 1036F TOBACCO NON-USER: CPT | Performed by: PHYSICIAN ASSISTANT

## 2020-02-04 NOTE — PROGRESS NOTES
Assessment/Plan:     Diagnoses and all orders for this visit:    Influenza  Patient is currently asymptomatic other then mild hoarse voice  Patients influenza resolved completely 6 days ago  Directed to continue with scheduled surgery  - Directed vocal rest and drinking warm liquids            Subjective:    Patient ID: Yaritza Castrejon is a 35 y o  female  Pt is presenting today for Hoarse voice and checkout prior to surgery  She has surgery scheduled in 2 days for left knee arthroscopy with Dr Collette Corona  He is requesting she be checked out due to the recent flu  Denies fevers, chills, sore throat,     2 weeks ago she tested positive for Flu and she recovered after about 8 days  URI    There has been no fever  Associated symptoms include coughing (trace)  Pertinent negatives include no chest pain, diarrhea, ear pain, headaches, nausea, plugged ear sensation, rhinorrhea, sinus pain, sore throat, vomiting or wheezing  She has tried nothing for the symptoms  The following portions of the patient's history were reviewed and updated as appropriate: allergies, current medications and problem list     Review of Systems   Constitutional: Negative for fatigue, fever and unexpected weight change  HENT: Negative for ear pain, rhinorrhea, sinus pain and sore throat  Respiratory: Positive for cough (trace)  Negative for shortness of breath and wheezing  Cardiovascular: Negative for chest pain, palpitations and leg swelling  Gastrointestinal: Negative for constipation, diarrhea, nausea and vomiting  Musculoskeletal: Negative for arthralgias and myalgias  Neurological: Negative for headaches  Objective:  /78   Pulse 101   Temp 98 7 °F (37 1 °C)   Resp 18   Ht 4' 11" (1 499 m)   Wt 75 8 kg (167 lb 3 2 oz)   SpO2 97%   BMI 33 77 kg/m²      Physical Exam   Constitutional: She is oriented to person, place, and time  She appears well-developed and well-nourished  No distress     HENT:   Head: Normocephalic and atraumatic  Right Ear: Tympanic membrane, external ear and ear canal normal    Left Ear: Tympanic membrane, external ear and ear canal normal    Mouth/Throat: Oropharynx is clear and moist  No oropharyngeal exudate or posterior oropharyngeal erythema  Hoarse voice   Eyes: Pupils are equal, round, and reactive to light  Neck: Normal range of motion  Neck supple  Cardiovascular: Normal rate, regular rhythm, normal heart sounds and intact distal pulses  Exam reveals no gallop and no friction rub  No murmur heard  Pulmonary/Chest: Effort normal and breath sounds normal  No respiratory distress  She has no wheezes  She has no rales  Lymphadenopathy:     She has no cervical adenopathy  Neurological: She is alert and oriented to person, place, and time  Skin: Skin is warm and dry  She is not diaphoretic  Psychiatric: She has a normal mood and affect  Her behavior is normal  Thought content normal    Vitals reviewed  BMI Counseling: Body mass index is 33 77 kg/m²  The BMI is above normal  Nutrition recommendations include reducing portion sizes, decreasing overall calorie intake, reducing intake of saturated fat and trans fat and reducing intake of cholesterol  Exercise recommendations include moderate aerobic physical activity for 150 minutes/week  BMI Counseling: Body mass index is 33 77 kg/m²  The BMI is above normal  Nutrition recommendations include decreasing portion sizes and encouraging healthy choices of fruits and vegetables  Exercise recommendations include moderate physical activity 150 minutes/week

## 2020-03-10 ENCOUNTER — TELEPHONE (OUTPATIENT)
Dept: OBGYN CLINIC | Facility: CLINIC | Age: 30
End: 2020-03-10

## 2020-03-10 DIAGNOSIS — B37.9 YEAST INFECTION: Primary | ICD-10-CM

## 2020-03-10 RX ORDER — FLUCONAZOLE 150 MG/1
150 TABLET ORAL EVERY OTHER DAY
Qty: 2 TABLET | Refills: 0 | Status: SHIPPED | OUTPATIENT
Start: 2020-03-10 | End: 2020-03-13

## 2020-03-13 ENCOUNTER — OFFICE VISIT (OUTPATIENT)
Dept: FAMILY MEDICINE CLINIC | Facility: CLINIC | Age: 30
End: 2020-03-13
Payer: COMMERCIAL

## 2020-03-13 VITALS
WEIGHT: 168 LBS | DIASTOLIC BLOOD PRESSURE: 72 MMHG | SYSTOLIC BLOOD PRESSURE: 110 MMHG | TEMPERATURE: 98.7 F | OXYGEN SATURATION: 98 % | HEIGHT: 59 IN | HEART RATE: 88 BPM | BODY MASS INDEX: 33.87 KG/M2

## 2020-03-13 DIAGNOSIS — K08.9 CHRONIC DENTAL PAIN: ICD-10-CM

## 2020-03-13 DIAGNOSIS — G89.29 CHRONIC DENTAL PAIN: ICD-10-CM

## 2020-03-13 DIAGNOSIS — H92.02 EAR PAIN, REFERRED, LEFT: Primary | ICD-10-CM

## 2020-03-13 PROCEDURE — 3008F BODY MASS INDEX DOCD: CPT | Performed by: PHYSICIAN ASSISTANT

## 2020-03-13 PROCEDURE — 99213 OFFICE O/P EST LOW 20 MIN: CPT | Performed by: FAMILY MEDICINE

## 2020-03-13 PROCEDURE — 1036F TOBACCO NON-USER: CPT | Performed by: FAMILY MEDICINE

## 2020-03-13 NOTE — PROGRESS NOTES
Subjective:      Patient ID: Elio Neri is a 35 y o  female  Earache    There is pain in the left ear  This is a recurrent problem  The current episode started yesterday  The problem occurs constantly  The problem has been unchanged  There has been no fever  The pain is at a severity of 4/10  The pain is moderate  Pertinent negatives include no coughing, diarrhea, ear discharge, headaches, hearing loss, neck pain, rash, rhinorrhea, sore throat or vomiting  Associated symptoms comments: Left upper molar dental pain  Chronic issue, patient just finished 10 day course of clindamycin  The treatment provided significant relief  Patient denies any TMJ pain  No symptoms of sore throat fever or chills  Patient states that she has followed up with her dentist closely because she has known problem in the left upper molar  Is waiting for dental insurance before she can get a definitive treatment for the tooth  Past Medical History:   Diagnosis Date    Allergic rhinitis 2010    Anemia     Gestational diabetes     Hyperemesis gravidarum, antepartum     Lump in neck 2011    Migraines     Palpitations 2011    Paronychia of finger     Viral gastroenteritis     Wears glasses        Family History   Problem Relation Age of Onset    Depression Mother     Diabetes Mother     Depression Family     Diabetes Father        Past Surgical History:   Procedure Laterality Date    APPENDECTOMY       SECTION      x2    TUBAL LIGATION          reports that she quit smoking about 2 years ago  She has never used smokeless tobacco  She reports that she drinks alcohol  She reports that she does not use drugs        Current Outpatient Medications:     acetaminophen (TYLENOL) 325 mg tablet, Take 650 mg by mouth every 6 (six) hours as needed for mild pain, Disp: , Rfl:     ibuprofen (MOTRIN) 200 mg tablet, Take by mouth every 6 (six) hours as needed for mild pain, Disp: , Rfl:     The following portions of the patient's history were reviewed and updated as appropriate: allergies, current medications, past family history, past medical history, past social history, past surgical history and problem list     Review of Systems   Constitutional: Negative  HENT: Positive for dental problem and ear pain  Negative for ear discharge, hearing loss, rhinorrhea, sinus pressure, sinus pain, sore throat and tinnitus  Respiratory: Negative  Negative for cough and shortness of breath  Cardiovascular: Negative  Negative for chest pain and palpitations  Gastrointestinal: Negative for diarrhea and vomiting  Musculoskeletal: Negative for myalgias and neck pain  Skin: Negative for rash  Neurological: Negative  Negative for headaches  Psychiatric/Behavioral: Negative  Objective:    /72 (BP Location: Left arm, Patient Position: Sitting, Cuff Size: Large)   Pulse 88   Temp 98 7 °F (37 1 °C)   Ht 4' 11" (1 499 m)   Wt 76 2 kg (168 lb)   SpO2 98%   BMI 33 93 kg/m²      Physical Exam   Constitutional: She is oriented to person, place, and time  She appears well-developed and well-nourished  HENT:   Right Ear: External ear normal    Left Ear: External ear normal    Mouth/Throat: No oropharyngeal exudate  Both TM normal     Cardiovascular: Normal rate, regular rhythm and normal heart sounds  Pulmonary/Chest: Effort normal and breath sounds normal    Abdominal: Soft  Bowel sounds are normal    Lymphadenopathy:     She has no cervical adenopathy  Neurological: She is alert and oriented to person, place, and time  Psychiatric: Her behavior is normal  Judgment normal          No results found for this or any previous visit (from the past 1008 hour(s))  Assessment/Plan:    No problem-specific Assessment & Plan notes found for this encounter             Problem List Items Addressed This Visit        Other    Ear pain, referred, left - Primary     The ear pain is likely referred from the left upper molar to for which she has just finished clindamycin for almost 10 days    Both The tympanic membranes appear normal   Patient advised to contact her dentist ASAP to check if she requires X ray /a change of antibiotic         Chronic dental pain

## 2020-03-13 NOTE — ASSESSMENT & PLAN NOTE
The ear pain is likely referred from the left upper molar to for which she has just finished clindamycin for almost 10 days    Both The tympanic membranes appear normal   Patient advised to contact her dentist ASAP to check if she requires X ray /a change of antibiotic

## 2020-07-09 NOTE — PROGRESS NOTES
Daily Note     Today's date: 2019  Patient name: Francisco Hussein  : 1990  MRN: [id number]  Referring provider: Chris Parekh MD  Dx:   Encounter Diagnosis     ICD-10-CM    1  Complete tear of anterior cruciate ligament of left knee, initial encounter S83 512A                    Subjective: Pt states she was going down the steps and one of her crutches missed the step  Pt states she fell but her left leg did not hit anything  Pt states her knee more or less straighten out  Pt states her she has had less pain in her calf than before  PT state she felt a "crack" or "snap" in the back of her knee  PT states she was more sore but she is not sure if it was from the fall or from being at therapy session  Objective: See treatment diary below      Assessment: Supervising PT notified of her fall and assested the pt knee  Supervising PT had not concerns and wants to add self STM and to continue to with PT as tolerated per protocol  Pt demonstrates improved ROM seen with manual therapy  Pt would continue to benefit from PT  Plan: Continue per plan of care        Precautions: Left ACL rupture 19      Manual            Left knee flex/ext PROM  10' 10'                                                                   Exercise Diary            Bike - ROM  4' incr nv 6'          Mini squats             Stand knee flex  2x19 2 x 19           Heel slides HEP 3' 5" x 20           Quad sets HEP 20x 20x           Gastroc strap stretch HEP 3x :20 3  X20"           SLR flex HEP 2x10 2 x10           SLR abd  2x10 2 x10           Iso hip add             TB ankle x 4  nv GTB 10x ea          Ankle alphabets  1x 1 x           WS on scale  #30x10 403 x 8                                                                                                                   Modalities             CP and elevation  10' home Pt informed of dr directives, verbalized understanding

## 2020-07-15 ENCOUNTER — ANNUAL EXAM (OUTPATIENT)
Dept: OBGYN CLINIC | Facility: CLINIC | Age: 30
End: 2020-07-15
Payer: COMMERCIAL

## 2020-07-15 VITALS
SYSTOLIC BLOOD PRESSURE: 118 MMHG | BODY MASS INDEX: 33.33 KG/M2 | TEMPERATURE: 97 F | DIASTOLIC BLOOD PRESSURE: 74 MMHG | WEIGHT: 165 LBS

## 2020-07-15 DIAGNOSIS — Z01.419 ENCOUNTER FOR GYNECOLOGICAL EXAMINATION (GENERAL) (ROUTINE) WITHOUT ABNORMAL FINDINGS: Primary | ICD-10-CM

## 2020-07-15 DIAGNOSIS — N92.6 IRREGULAR MENSTRUAL CYCLE: ICD-10-CM

## 2020-07-15 DIAGNOSIS — Z11.3 SCREENING FOR STD (SEXUALLY TRANSMITTED DISEASE): ICD-10-CM

## 2020-07-15 DIAGNOSIS — Z86.32 HISTORY OF GESTATIONAL DIABETES: ICD-10-CM

## 2020-07-15 PROCEDURE — 87491 CHLMYD TRACH DNA AMP PROBE: CPT | Performed by: PHYSICIAN ASSISTANT

## 2020-07-15 PROCEDURE — G0145 SCR C/V CYTO,THINLAYER,RESCR: HCPCS | Performed by: PHYSICIAN ASSISTANT

## 2020-07-15 PROCEDURE — 87591 N.GONORRHOEAE DNA AMP PROB: CPT | Performed by: PHYSICIAN ASSISTANT

## 2020-07-15 PROCEDURE — 99395 PREV VISIT EST AGE 18-39: CPT | Performed by: PHYSICIAN ASSISTANT

## 2020-07-15 NOTE — PROGRESS NOTES
Assessment/Plan   Diagnoses and all orders for this visit:    Encounter for gynecological examination (general) (routine) without abnormal findings  -     Liquid-based pap, screening  The current ASCCP guidelines were reviewed  Patient's last pap was 1/29/18 - WNL (-) ECC and therefore, a pap is indicated at this time  I emphasized the importance of an annual pelvic and breast exam  Patient ok to have a pap done today  Screening for STD (sexually transmitted disease)  -     Hepatitis B surface antigen; Future  -     Hepatitis C antibody; Future  -     HIV 1/2 Antigen/Antibody (4th Generation) w Reflex [facility]; Future  -     RPR; Future  -     Chlamydia/GC amplified DNA by PCR  Encourage safe sexual practices; STD testing - cultures collected and STD labs ordered    Irregular menstrual cycle  -     CBC and differential; Future  -     Comprehensive metabolic panel; Future  -     Follicle stimulating hormone; Future  -     hCG, quantitative; Future  -     Luteinizing hormone; Future  -     Prolactin; Future  -     T4, free; Future  -     TSH, 3rd generation; Future  -     US pelvis complete w transvaginal; Future  -     HEMOGLOBIN A1C W/ EAG ESTIMATION; Future  Reviewed irregular menses and considering FH - may very well be early menopause  Will plan labs and a pelvic ultrasound (follow-up on history of ovarian cysts as well) for evaluation  Will plan to call to review results and recommendations  Reviewed period q 3 months to decrease risk of endometrial hyperplasia  Patient should call if no period x 3 months or more    History of gestational diabetes  -     Comprehensive metabolic panel; Future  -     HEMOGLOBIN A1C W/ EAG ESTIMATION; Future  Encourage patient to perform labs fasting to screen for DM with history of GDM    Discussion  I have discussed the importance of monthly self-breast exams, exercise and healthy diet as well as adequate intake of calcium and vitamin D   Encourage MVI q day and r/[place] importance of folic acid; Encourage 30-40 min weight bearing exercise most days of week  Contraception - BTL  The patient has had the Gardasil vaccine series, which is recommended for patients from 545 years of age  All questions have been answered to her satisfaction  RTO for APE or sooner if needed    Subjective     HPI   Stevenson Knowles is a 35 y o  female who presents for annual well woman exam    Menarche - 16; LMP - 5/13/20; Periods have been irregular for the past couple years now  Sometimes may come monthly while other times she may go as long as 2 months without a period  The past 2 months she had PMS symptoms of cramps and HA for about a week with one day of a slight tinge of bleeding - not even spotting  In the past, periods had been regular q month  MGM stopped periods at 29 y/o and mother at 30 y/o  Periods last 4-5 days; No excessive bleeding; No intermenstrual bleeding or spotting; Cramps are tolerable  No significant hot flashes/night sweats; Does have a history of ovarian cysts in the past   No vulvar itch/burn; No vaginal itch/burn; No abn discharge or odor; No urinary sx - burning/pain/frequency/hematuria  (+) SBEs - no breast masses, asymmetry, nipple discharge or bleeding, changes in skin of breast, or breast tenderness bilaterally  No abd/pelvic pain or HAs;   Pt is sexually active in a mutually monog sexual relationship x 3 yrs; No issues with intercourse; She requests std/hiv/hep testing; Feels safe at home  Current contraception: BTL  Condom use: sometimes  Gardasil - per patient received all 3   (+) PCP for routine Bw/care; Last Pap - 1/29/18 - WNL (-) ECC C/G/T negative  History of abnormal Pap smear: unsure  Last STD screen - 2/1/18 - STD labs WNL    Review of Systems   Constitutional: Negative for activity change, fatigue, fever and unexpected weight change  HENT: Negative for congestion, dental problem, sinus pressure and sinus pain  Eyes: Negative for visual disturbance     Respiratory: Negative for cough, shortness of breath and wheezing  Cardiovascular: Negative for chest pain and leg swelling  Gastrointestinal: Negative for abdominal distention, abdominal pain, blood in stool, constipation, diarrhea, nausea and vomiting  Endocrine: Negative for polydipsia  Genitourinary: Negative for difficulty urinating, dyspareunia, dysuria, frequency, hematuria, menstrual problem, pelvic pain, urgency, vaginal bleeding, vaginal discharge and vaginal pain  Musculoskeletal: Negative for arthralgias and back pain  Allergic/Immunologic: Negative for environmental allergies  Neurological: Negative for dizziness, seizures and headaches  Psychiatric/Behavioral: Negative for dysphoric mood and sleep disturbance  The patient is not nervous/anxious          The following portions of the patient's history were reviewed and updated as appropriate: allergies, current medications, past family history, past medical history, past social history, past surgical history and problem list          OB History        2    Para   2    Term   2            AB        Living           SAB        TAB        Ectopic        Multiple        Live Births                     Past Medical History:   Diagnosis Date    Allergic rhinitis 2010    Anemia     Gestational diabetes     Hyperemesis gravidarum, antepartum     Lump in neck 2011    Migraines     Palpitations 2011    Paronychia of finger     Viral gastroenteritis     Wears glasses        Past Surgical History:   Procedure Laterality Date    APPENDECTOMY       SECTION      x2    TUBAL LIGATION         Family History   Problem Relation Age of Onset    Depression Mother     Diabetes Mother     Depression Family     Diabetes Father        Social History     Socioeconomic History    Marital status: Single     Spouse name: Not on file    Number of children: Not on file    Years of education: Not on file    Highest education level: Not on file   Occupational History    Not on file   Social Needs    Financial resource strain: Not on file    Food insecurity:     Worry: Not on file     Inability: Not on file    Transportation needs:     Medical: Not on file     Non-medical: Not on file   Tobacco Use    Smoking status: Former Smoker     Last attempt to quit: 2018     Years since quittin 5    Smokeless tobacco: Never Used    Tobacco comment: prior-occ cig, now not smoking   Substance and Sexual Activity    Alcohol use: Yes     Frequency: Monthly or less     Drinks per session: 1 or 2     Binge frequency: Never    Drug use: No    Sexual activity: Yes     Birth control/protection: Female Sterilization   Lifestyle    Physical activity:     Days per week: Not on file     Minutes per session: Not on file    Stress: Not on file   Relationships    Social connections:     Talks on phone: Not on file     Gets together: Not on file     Attends Anabaptism service: Not on file     Active member of club or organization: Not on file     Attends meetings of clubs or organizations: Not on file     Relationship status: Not on file    Intimate partner violence:     Fear of current or ex partner: Not on file     Emotionally abused: Not on file     Physically abused: Not on file     Forced sexual activity: Not on file   Other Topics Concern    Not on file   Social History Narrative    Not on file         Current Outpatient Medications:     acetaminophen (TYLENOL) 325 mg tablet, Take 650 mg by mouth every 6 (six) hours as needed for mild pain, Disp: , Rfl:     ibuprofen (MOTRIN) 200 mg tablet, Take by mouth every 6 (six) hours as needed for mild pain, Disp: , Rfl:     Allergies   Allergen Reactions    Vicodin [Hydrocodone-Acetaminophen] Anaphylaxis     No epi pen    Amoxicillin Hives     No epi pen    Zyrtec [Cetirizine] Hives     No epi pen       Objective   Vitals:    07/15/20 1137   BP: 118/74   BP Location: Right arm Patient Position: Sitting   Cuff Size: Standard   Temp: (!) 97 °F (36 1 °C)   Weight: 74 8 kg (165 lb)     Physical Exam   Constitutional: She appears well-developed and well-nourished  No distress  Neck: No thyromegaly present  Cardiovascular: Normal rate, regular rhythm and normal heart sounds  No murmur heard  Pulmonary/Chest: Effort normal and breath sounds normal  No respiratory distress  She has no wheezes  Right breast exhibits no inverted nipple, no mass, no nipple discharge, no skin change and no tenderness  Left breast exhibits no inverted nipple, no mass, no nipple discharge, no skin change and no tenderness  Breasts are symmetrical    Abdominal: Soft  She exhibits no distension and no mass  There is no tenderness  Genitourinary: Vagina normal and uterus normal  Pelvic exam was performed with patient supine  There is no rash, tenderness or lesion on the right labia  There is no rash, tenderness or lesion on the left labia  Uterus is not deviated, not enlarged, not fixed and not tender  Cervix exhibits no motion tenderness, no discharge and no friability  Right adnexum displays no mass, no tenderness and no fullness  Left adnexum displays no mass, no tenderness and no fullness  No erythema, tenderness or bleeding in the vagina  No foreign body in the vagina  No vaginal discharge found  Musculoskeletal: She exhibits no edema  Lymphadenopathy:     She has no cervical adenopathy  She has no axillary adenopathy  Right: No inguinal adenopathy present  Left: No inguinal adenopathy present  Neurological: She is alert  Skin: Skin is warm  She is not diaphoretic  Psychiatric: She has a normal mood and affect  Her behavior is normal    Vitals reviewed

## 2020-07-15 NOTE — ASSESSMENT & PLAN NOTE
Reviewed irregular menses and considering FH - may very well be early menopause  Will plan labs and a pelvic ultrasound (follow-up on history of ovarian cysts as well) for evaluation  Will plan to call to review results and recommendations  Reviewed period q 3 months to decrease risk of endometrial hyperplasia   Patient should call if no period x 3 months or more

## 2020-07-15 NOTE — ASSESSMENT & PLAN NOTE
The current ASCCP guidelines were reviewed  Patient's last pap was 1/29/18 - WNL (-) ECC and therefore, a pap is indicated at this time  I emphasized the importance of an annual pelvic and breast exam  Patient ok to have a pap done today

## 2020-07-16 LAB
C TRACH DNA SPEC QL NAA+PROBE: NEGATIVE
N GONORRHOEA DNA SPEC QL NAA+PROBE: NEGATIVE

## 2020-07-23 LAB
LAB AP GYN PRIMARY INTERPRETATION: NORMAL
LAB AP LMP: NORMAL
Lab: NORMAL

## 2020-08-24 ENCOUNTER — TELEPHONE (OUTPATIENT)
Dept: OBGYN CLINIC | Facility: CLINIC | Age: 30
End: 2020-08-24

## 2020-08-24 DIAGNOSIS — B37.9 YEAST INFECTION: Primary | ICD-10-CM

## 2020-08-24 RX ORDER — FLUCONAZOLE 150 MG/1
150 TABLET ORAL EVERY OTHER DAY
Qty: 2 TABLET | Refills: 0 | Status: SHIPPED | OUTPATIENT
Start: 2020-08-24 | End: 2020-08-27

## 2020-08-24 NOTE — TELEPHONE ENCOUNTER
[facility]  Pt aware was sent to provider and should be ready later this afternoon

## 2021-01-05 ENCOUNTER — TELEPHONE (OUTPATIENT)
Dept: OBGYN CLINIC | Facility: CLINIC | Age: 31
End: 2021-01-05

## 2021-01-05 DIAGNOSIS — B37.3 VAGINAL YEAST INFECTION: Primary | ICD-10-CM

## 2021-01-05 RX ORDER — FLUCONAZOLE 150 MG/1
150 TABLET ORAL EVERY OTHER DAY
Qty: 2 TABLET | Refills: 0 | Status: SHIPPED | OUTPATIENT
Start: 2021-01-05 | End: 2021-01-08

## 2021-01-11 ENCOUNTER — TELEMEDICINE (OUTPATIENT)
Dept: FAMILY MEDICINE CLINIC | Facility: CLINIC | Age: 31
End: 2021-01-11
Payer: COMMERCIAL

## 2021-01-11 DIAGNOSIS — H69.82 DYSFUNCTION OF LEFT EUSTACHIAN TUBE: Primary | ICD-10-CM

## 2021-01-11 PROCEDURE — 99213 OFFICE O/P EST LOW 20 MIN: CPT | Performed by: PHYSICIAN ASSISTANT

## 2021-01-11 RX ORDER — FLUTICASONE PROPIONATE 50 MCG
2 SPRAY, SUSPENSION (ML) NASAL DAILY
Qty: 1 BOTTLE | Refills: 0 | Status: SHIPPED | OUTPATIENT
Start: 2021-01-11 | End: 2021-07-19 | Stop reason: ALTCHOICE

## 2021-01-11 NOTE — PROGRESS NOTES
Virtual Regular Visit      Assessment/Plan:    Problem List Items Addressed This Visit     None      Visit Diagnoses     Dysfunction of left eustachian tube    -  Primary    Relevant Medications    fluticasone (FLONASE) 50 mcg/act nasal spray      Completed course of azithromycin  Educated on use of saline nasal spray and intranasal steroid spray  - Pt will continue with OTC supportive medications  - Directed pt to return if fever over 101, visual changes, worsening headache  Reason for visit is   Chief Complaint   Patient presents with    Virtual Regular Visit        Encounter provider Francisco J Nagel PA-C    Provider located at [place] [address]  [phone]      Recent Visits  No visits were found meeting these conditions  Showing recent visits within past 7 days and meeting all other requirements     Today's Visits  Date Type Provider Dept   01/11/21 Telemedicine [facility]   Showing today's visits and meeting all other requirements     Future Appointments  No visits were found meeting these conditions  Showing future appointments within next 150 days and meeting all other requirements        The patient was identified by name and date of birth  Anai Nicholas was informed that this is a telemedicine visit and that the visit is being conducted through [facility] and patient was informed that this is a secure, HIPAA-compliant platform  She agrees to proceed     My office door was closed  No one else was in the room  She acknowledged consent and understanding of privacy and security of the video platform  The patient has agreed to participate and understands they can discontinue the visit at any time  Patient is aware this is a billable service  Subjective  Anai Nicholas is a 35 y o  female for Left side ear pressure for 7 days   She was seen at [facility] on 1/5, diagnosed with AOM and was given ZPak (allergic to PCN)  It continues to feel clogged with occasional pain  Denies any itch eyes or nose  Sometimes has "cold weather allergies "    Several ear infections in the past year  Earache   There is pain in the left ear  This is a new problem  The current episode started 1 to 4 weeks ago  The problem occurs every few hours  The problem has been waxing and waning  There has been no fever  Associated symptoms include rhinorrhea  Pertinent negatives include no abdominal pain, coughing, diarrhea, ear discharge, headaches, hearing loss or sore throat  She has tried antibiotics for the symptoms  Past Medical History:   Diagnosis Date    Allergic rhinitis 2010    Anemia     Gestational diabetes     Hyperemesis gravidarum, antepartum     Lump in neck 2011    Migraines     Palpitations 2011    Paronychia of finger     Viral gastroenteritis     Wears glasses        Past Surgical History:   Procedure Laterality Date    APPENDECTOMY       SECTION      x2    TUBAL LIGATION         Current Outpatient Medications   Medication Sig Dispense Refill    acetaminophen (TYLENOL) 325 mg tablet Take 650 mg by mouth every 6 (six) hours as needed for mild pain      fluticasone (FLONASE) 50 mcg/act nasal spray 2 sprays into each nostril daily 1 Bottle 0    ibuprofen (MOTRIN) 200 mg tablet Take by mouth every 6 (six) hours as needed for mild pain       No current facility-administered medications for this visit  Allergies   Allergen Reactions    Vicodin [Hydrocodone-Acetaminophen] Anaphylaxis     No epi pen    Amoxicillin Hives     No epi pen    Zyrtec [Cetirizine] Hives     No epi pen       Review of Systems   Constitutional: Negative for fatigue, fever and unexpected weight change  HENT: Positive for ear pain and rhinorrhea  Negative for ear discharge, hearing loss and sore throat  Respiratory: Negative for cough, shortness of breath and wheezing  Cardiovascular: Negative for chest pain, palpitations and leg swelling  Gastrointestinal: Negative for abdominal pain, constipation, diarrhea and nausea  Musculoskeletal: Negative for arthralgias and myalgias  Neurological: Negative for headaches  Video Exam    There were no vitals filed for this visit  Physical Exam  Constitutional:       General: She is not in acute distress  Appearance: She is well-developed  She is not diaphoretic  HENT:      Head: Normocephalic and atraumatic  Pulmonary:      Effort: Pulmonary effort is normal  No respiratory distress  Breath sounds: No wheezing  Neurological:      Mental Status: She is alert and oriented to person, place, and time  Psychiatric:         Behavior: Behavior normal          Thought Content: Thought content normal           I spent 16 minutes directly with the patient during this visit      Alondra Stover acknowledges that she has consented to an online visit or consultation  She understands that the online visit is based solely on information provided by her, and that, in the absence of a face-to-face physical evaluation by the physician, the diagnosis she receives is both limited and provisional in terms of accuracy and completeness  This is not intended to replace a full medical face-to-face evaluation by the physician  Shamar Castro understands and accepts these terms

## 2021-03-02 ENCOUNTER — TELEPHONE (OUTPATIENT)
Dept: FAMILY MEDICINE CLINIC | Facility: CLINIC | Age: 31
End: 2021-03-02

## 2021-03-02 DIAGNOSIS — H69.82 DYSFUNCTION OF LEFT EUSTACHIAN TUBE: Primary | ICD-10-CM

## 2021-03-02 NOTE — TELEPHONE ENCOUNTER
Patient phoned requesting ambulatory referral to ENT  She will be seeing [facility]   She was not sure if this is part of [facility] [place] [facility] so she gave fax # to send referral:  [phone]     Patient said she has not scheduled an appt yet but will call back when she schedules and provide name of whom she will have an appt with

## 2021-03-29 DIAGNOSIS — N92.6 IRREGULAR MENSTRUAL CYCLE: Primary | ICD-10-CM

## 2021-03-30 LAB
ALBUMIN SERPL-MCNC: 4.3 G/DL (ref 3.6–5.1)
ALBUMIN/GLOB SERPL: 1.4 (CALC) (ref 1–2.5)
ALP SERPL-CCNC: 77 U/L (ref 31–125)
ALT SERPL-CCNC: 29 U/L (ref 6–29)
AST SERPL-CCNC: 25 U/L (ref 10–30)
B-HCG SERPL-ACNC: <3 MIU/ML
BASOPHILS # BLD AUTO: 62 CELLS/UL (ref 0–200)
BASOPHILS NFR BLD AUTO: 1 %
BILIRUB SERPL-MCNC: 0.5 MG/DL (ref 0.2–1.2)
BUN SERPL-MCNC: 12 MG/DL (ref 7–25)
BUN/CREAT SERPL: NORMAL (CALC) (ref 6–22)
CALCIUM SERPL-MCNC: 9.4 MG/DL (ref 8.6–10.2)
CHLORIDE SERPL-SCNC: 102 MMOL/L (ref 98–110)
CO2 SERPL-SCNC: 25 MMOL/L (ref 20–32)
CREAT SERPL-MCNC: 0.81 MG/DL (ref 0.5–1.1)
EOSINOPHIL # BLD AUTO: 130 CELLS/UL (ref 15–500)
EOSINOPHIL NFR BLD AUTO: 2.1 %
ERYTHROCYTE [DISTWIDTH] IN BLOOD BY AUTOMATED COUNT: 12.8 % (ref 11–15)
EST. AVERAGE GLUCOSE BLD GHB EST-MCNC: 91 (CALC)
EST. AVERAGE GLUCOSE BLD GHB EST-SCNC: 5 (CALC)
FSH SERPL-ACNC: 6.4 MIU/ML
GLOBULIN SER CALC-MCNC: 3.1 G/DL (CALC) (ref 1.9–3.7)
GLUCOSE SERPL-MCNC: 72 MG/DL (ref 65–99)
HBA1C MFR BLD: 4.8 % OF TOTAL HGB
HBV SURFACE AG SERPL QL IA: NORMAL
HCT VFR BLD AUTO: 41 % (ref 35–45)
HCV AB S/CO SERPL IA: 0.01
HCV AB SERPL QL IA: NORMAL
HGB BLD-MCNC: 13.2 G/DL (ref 11.7–15.5)
HIV 1+2 AB+HIV1 P24 AG SERPL QL IA: NORMAL
LH SERPL-ACNC: 15.4 MIU/ML
LYMPHOCYTES # BLD AUTO: 1748 CELLS/UL (ref 850–3900)
LYMPHOCYTES NFR BLD AUTO: 28.2 %
MCH RBC QN AUTO: 26.3 PG (ref 27–33)
MCHC RBC AUTO-ENTMCNC: 32.2 G/DL (ref 32–36)
MCV RBC AUTO: 81.8 FL (ref 80–100)
MONOCYTES # BLD AUTO: 279 CELLS/UL (ref 200–950)
MONOCYTES NFR BLD AUTO: 4.5 %
NEUTROPHILS # BLD AUTO: 3980 CELLS/UL (ref 1500–7800)
NEUTROPHILS NFR BLD AUTO: 64.2 %
PLATELET # BLD AUTO: 311 THOUSAND/UL (ref 140–400)
PMV BLD REES-ECKER: 9.9 FL (ref 7.5–12.5)
POTASSIUM SERPL-SCNC: 4 MMOL/L (ref 3.5–5.3)
PROLACTIN SERPL-MCNC: 4.8 NG/ML
PROT SERPL-MCNC: 7.4 G/DL (ref 6.1–8.1)
RBC # BLD AUTO: 5.01 MILLION/UL (ref 3.8–5.1)
RPR SER QL: NORMAL
SL AMB EGFR AFRICAN AMERICAN: 113 ML/MIN/1.73M2
SL AMB EGFR NON AFRICAN AMERICAN: 97 ML/MIN/1.73M2
SODIUM SERPL-SCNC: 136 MMOL/L (ref 135–146)
T4 FREE SERPL-MCNC: 1.2 NG/DL (ref 0.8–1.8)
TSH SERPL-ACNC: 1.17 MIU/L
WBC # BLD AUTO: 6.2 THOUSAND/UL (ref 3.8–10.8)

## 2021-03-31 ENCOUNTER — TELEPHONE (OUTPATIENT)
Dept: FAMILY MEDICINE CLINIC | Facility: CLINIC | Age: 31
End: 2021-03-31

## 2021-03-31 NOTE — TELEPHONE ENCOUNTER
Patient had indirect exposure to positive Covid  Given instruction to quarantine & watch for symptoms for 10 days  (4) no limitation

## 2021-04-13 ENCOUNTER — TELEMEDICINE (OUTPATIENT)
Dept: FAMILY MEDICINE CLINIC | Facility: CLINIC | Age: 31
End: 2021-04-13
Payer: COMMERCIAL

## 2021-04-13 DIAGNOSIS — U07.1 COVID-19 VIRUS INFECTION: Primary | ICD-10-CM

## 2021-04-13 PROCEDURE — 99213 OFFICE O/P EST LOW 20 MIN: CPT | Performed by: PHYSICIAN ASSISTANT

## 2021-04-13 NOTE — PROGRESS NOTES
COVID-19 Outpatient Progress Note    Assessment/Plan:    Problem List Items Addressed This Visit     None         Disposition:     I recommended self-quarantine for 10 days and to watch for symptoms until 14 days after exposure  If patient were to develop symptoms, they should self isolate and call our office for further guidance  I referred patient to one of our centralized sites for a COVID-19 swab  I have spent 12 minutes directly with the patient  Greater than 50% of this time was spent in counseling/coordination of care regarding: prognosis, patient and family education and importance of treatment compliance  Encounter provider Kristian Calero PA-C    Provider located at [address]  [phone]    Recent Visits  No visits were found meeting these conditions  Showing recent visits within past 7 days and meeting all other requirements     Today's Visits  Date Type Provider Dept   04/13/21 Telemedicine Kristian Calero PA-C [facility]   Showing today's visits and meeting all other requirements     Future Appointments  No visits were found meeting these conditions  Showing future appointments within next 150 days and meeting all other requirements      This virtual check-in was done via [employer] and patient was informed that this is a secure, HIPAA-compliant platform  She agrees to proceed  Patient agrees to participate in a virtual check in via telephone or video visit instead of presenting to the office to address urgent/immediate medical needs  Patient is aware this is a billable service  After connecting through [facility], the patient was identified by name and date of birth  Magdi Velasco was informed that this was a telemedicine visit and that the exam was being conducted confidentially over secure lines  My office door was closed  No one else was in the room   Magdi Velasco acknowledged consent and understanding of privacy and security of the telemedicine visit  I informed the patient that I have reviewed her record in Epic and presented the opportunity for her to ask any questions regarding the visit today  The patient agreed to participate  Subjective:   Juan Ramon Tapia is a 28 y o  female who is concerned about COVID-19  Patient's symptoms include shortness of breath (with walking outside) and chest tightness  Patient denies fever and cough       Date of symptom onset: 4/10/2021  Date of exposure: 2021    Exposure:   Contact with a person who is under investigation (PUI) for or who is positive for COVID-19 within the last 14 days?: Yes    Hospitalized recently for fever and/or lower respiratory symptoms?: No      Currently a healthcare worker that is involved in direct patient care?: No      Works in a special setting where the risk of COVID-19 transmission may be high? (this may include long-term care, correctional and [facility] facilities; homeless shelters; assisted-living facilities and group homes ): No      Resident in a special setting where the risk of COVID-19 transmission may be high? (this may include long-term care, correctional and [facility] facilities; homeless shelters; assisted-living facilities and group homes ): No      SpO2 of     No results found for: Romeo Reap, [address] [place] [address], [address] [place] [address],[address] [address]  Past Medical History:   Diagnosis Date    Allergic rhinitis 2010    Anemia     Gestational diabetes     Hyperemesis gravidarum, antepartum     Lump in neck 2011    Migraines     Palpitations 2011    Paronychia of finger     Viral gastroenteritis     Wears glasses      Past Surgical History:   Procedure Laterality Date    APPENDECTOMY       SECTION      x2    TUBAL LIGATION       Current Outpatient Medications   Medication Sig Dispense Refill    acetaminophen (TYLENOL) 325 mg tablet Take 650 mg by mouth every 6 (six) hours as needed for mild pain  fluticasone (FLONASE) 50 mcg/act nasal spray 2 sprays into each nostril daily 1 Bottle 0    ibuprofen (MOTRIN) 200 mg tablet Take by mouth every 6 (six) hours as needed for mild pain       No current facility-administered medications for this visit  Allergies   Allergen Reactions    Vicodin [Hydrocodone-Acetaminophen] Anaphylaxis     No epi pen    Amoxicillin Hives     No epi pen    Zyrtec [Cetirizine] Hives     No epi pen       Review of Systems   Constitutional: Negative for fever  Respiratory: Positive for chest tightness and shortness of breath (with walking outside)  Negative for cough  Objective: There were no vitals filed for this visit  Physical Exam  Constitutional:       General: She is not in acute distress  Appearance: She is well-developed  She is not diaphoretic  HENT:      Head: Normocephalic  Pulmonary:      Effort: Pulmonary effort is normal    Neurological:      Mental Status: She is alert  Psychiatric:         Behavior: Behavior normal          Thought Content: Thought content normal          Judgment: Judgment normal        VIRTUAL VISIT DISCLAIMER    Xochitl Chun acknowledges that she has consented to an online visit or consultation  She understands that the online visit is based solely on information provided by her, and that, in the absence of a face-to-face physical evaluation by the physician, the diagnosis she receives is both limited and provisional in terms of accuracy and completeness  This is not intended to replace a full medical face-to-face evaluation by the physician  Ether Dec understands and accepts these terms

## 2021-04-29 ENCOUNTER — TELEMEDICINE (OUTPATIENT)
Dept: FAMILY MEDICINE CLINIC | Facility: CLINIC | Age: 31
End: 2021-04-29
Payer: COMMERCIAL

## 2021-04-29 DIAGNOSIS — U07.1 COVID-19 VIRUS INFECTION: Primary | ICD-10-CM

## 2021-04-29 PROCEDURE — 99213 OFFICE O/P EST LOW 20 MIN: CPT | Performed by: PHYSICIAN ASSISTANT

## 2021-04-29 NOTE — PROGRESS NOTES
COVID-19 Outpatient Progress Note    Assessment/Plan:    Problem List Items Addressed This Visit     None      Visit Diagnoses     COVID-19 virus infection    -  Primary         Disposition:     I recommended continued isolation until at least 24 hours have passed since recovery defined as resolution of fever without the use of fever-reducing medications AND improvement in COVID symptoms AND 10 days have passed since onset of symptoms (or 10 days have passed since date of first positive viral diagnostic test for asymptomatic patients)  I have spent 17 minutes directly with the patient  Greater than 50% of this time was spent in counseling/coordination of care regarding: prognosis, patient and family education and importance of treatment compliance  Encounter provider Kisha Coles PA-C    Provider located at [place] [address]  [phone]    Recent Visits  No visits were found meeting these conditions  Showing recent visits within past 7 days and meeting all other requirements     Today's Visits  Date Type Provider Dept   04/29/21 Telemedicine Kisha Coles PA-C [facility]   Showing today's visits and meeting all other requirements     Future Appointments  No visits were found meeting these conditions  Showing future appointments within next 150 days and meeting all other requirements      This virtual check-in was done via [email or web address] and patient was informed that this is a secure, HIPAA-compliant platform  She agrees to proceed  Patient agrees to participate in a virtual check in via telephone or video visit instead of presenting to the office to address urgent/immediate medical needs  Patient is aware this is a billable service  After connecting through [facility], the patient was identified by name and date of birth   Stevenson Knowles was informed that this was a telemedicine visit and that the exam was being conducted confidentially over secure lines  My office door was closed  No one else was in the room  Misti Luna acknowledged consent and understanding of privacy and security of the telemedicine visit  I informed the patient that I have reviewed her record in Epic and presented the opportunity for her to ask any questions regarding the visit today  The patient agreed to participate  Subjective:   Misti Luna is a 28 y o  female who has been screened for COVID-19  Symptom change since last report: resolving  Patient is currently asymptomatic  Patient denies fever, chills, fatigue, malaise, congestion, rhinorrhea, sore throat, anosmia, loss of taste, cough, shortness of breath, chest tightness, abdominal pain, nausea, vomiting, diarrhea, myalgias and headaches  Janeen Nassar has been staying home and has isolated themselves in her home  She is taking care to not share personal items and is cleaning all surfaces that are touched often, like counters, tabletops, and doorknobs using household cleaning sprays or wipes  She is wearing a mask when she leaves her room  Date of symptom onset: 4/10/2021  Date of exposure: 2021    Initially chest symptoms and moved to sinus symptoms  Retested on  and son and  tested positive   Daughter tested negative both times    Main concern    No results found for: Estela Blunt  Past Medical History:   Diagnosis Date    Allergic rhinitis 2010    Anemia     Gestational diabetes     Hyperemesis gravidarum, antepartum     Lump in neck 2011    Migraines     Palpitations 2011    Paronychia of finger     Viral gastroenteritis     Wears glasses      Past Surgical History:   Procedure Laterality Date    APPENDECTOMY       SECTION      x2    TUBAL LIGATION       Current Outpatient Medications   Medication Sig Dispense Refill    acetaminophen (TYLENOL) 325 mg tablet Take 650 mg by mouth every 6 (six) hours as needed for mild pain      fluticasone (FLONASE) 50 mcg/act nasal spray 2 sprays into each nostril daily 1 Bottle 0    ibuprofen (MOTRIN) 200 mg tablet Take by mouth every 6 (six) hours as needed for mild pain       No current facility-administered medications for this visit  Allergies   Allergen Reactions    Vicodin [Hydrocodone-Acetaminophen] Anaphylaxis     No epi pen    Amoxicillin Hives     No epi pen    Zyrtec [Cetirizine] Hives     No epi pen       Review of Systems   Constitutional: Negative for chills, fatigue and fever  HENT: Negative for congestion, rhinorrhea and sore throat  Respiratory: Negative for cough, chest tightness and shortness of breath  Gastrointestinal: Negative for abdominal pain, diarrhea, nausea and vomiting  Musculoskeletal: Negative for myalgias  Neurological: Negative for headaches  Objective: There were no vitals filed for this visit  Physical Exam  VIRTUAL VISIT DISCLAIMER    Xochitl Chun acknowledges that she has consented to an online visit or consultation  She understands that the online visit is based solely on information provided by her, and that, in the absence of a face-to-face physical evaluation by the physician, the diagnosis she receives is both limited and provisional in terms of accuracy and completeness  This is not intended to replace a full medical face-to-face evaluation by the physician  Mora Fuentes understands and accepts these terms

## 2021-05-17 ENCOUNTER — TELEPHONE (OUTPATIENT)
Dept: FAMILY MEDICINE CLINIC | Facility: CLINIC | Age: 31
End: 2021-05-17

## 2021-05-17 DIAGNOSIS — B35.9 DERMATOPHYTOSIS: Primary | ICD-10-CM

## 2021-05-17 RX ORDER — CLOTRIMAZOLE AND BETAMETHASONE DIPROPIONATE 10; .64 MG/G; MG/G
CREAM TOPICAL 2 TIMES DAILY
Qty: 30 G | Refills: 0 | Status: SHIPPED | OUTPATIENT
Start: 2021-05-17 | End: 2021-07-19 | Stop reason: ALTCHOICE

## 2021-05-17 NOTE — TELEPHONE ENCOUNTER
I sent over a topical cream to the pharmacy  Can be used twice daily but absolutely not more then 7-10 days before giving it a few week break

## 2021-05-17 NOTE — TELEPHONE ENCOUNTER
Patient has tried OTC fungal cream for her under arm rash  She says the medication makes it worse       [facility]

## 2021-05-21 LAB
INSULIN SERPL-ACNC: 19.3 UIU/ML
TESTOST FREE SERPL-MCNC: 7.8 PG/ML (ref 0.1–6.4)
TESTOST SERPL-MCNC: 43 NG/DL (ref 2–45)

## 2021-06-04 ENCOUNTER — TELEPHONE (OUTPATIENT)
Dept: OBGYN CLINIC | Facility: CLINIC | Age: 31
End: 2021-06-04

## 2021-06-04 NOTE — TELEPHONE ENCOUNTER
Pt called to speak with Michi Dove about starting on medication for PCOs  Pt states she spoke with her  and sister  Pt was not sure if she needs apt to review or the phone call where her and Michi Dove discussed was enough  Pt aware will review with Michi Dove and call her back Monday  Pt aware apts are going into July  Pt is due for annual after 7/15/21

## 2021-07-19 ENCOUNTER — ANNUAL EXAM (OUTPATIENT)
Dept: OBGYN CLINIC | Facility: CLINIC | Age: 31
End: 2021-07-19
Payer: COMMERCIAL

## 2021-07-19 VITALS
HEIGHT: 59 IN | WEIGHT: 166.8 LBS | BODY MASS INDEX: 33.63 KG/M2 | SYSTOLIC BLOOD PRESSURE: 124 MMHG | DIASTOLIC BLOOD PRESSURE: 84 MMHG

## 2021-07-19 DIAGNOSIS — Z01.419 ENCOUNTER FOR GYNECOLOGICAL EXAMINATION (GENERAL) (ROUTINE) WITHOUT ABNORMAL FINDINGS: Primary | ICD-10-CM

## 2021-07-19 DIAGNOSIS — E28.2 PCOS (POLYCYSTIC OVARIAN SYNDROME): ICD-10-CM

## 2021-07-19 PROBLEM — Z11.3 SCREEN FOR STD (SEXUALLY TRANSMITTED DISEASE): Status: RESOLVED | Noted: 2018-01-29 | Resolved: 2021-07-19

## 2021-07-19 PROCEDURE — 99395 PREV VISIT EST AGE 18-39: CPT | Performed by: PHYSICIAN ASSISTANT

## 2021-07-19 PROCEDURE — 3008F BODY MASS INDEX DOCD: CPT | Performed by: PHYSICIAN ASSISTANT

## 2021-07-19 PROCEDURE — 0503F POSTPARTUM CARE VISIT: CPT | Performed by: PHYSICIAN ASSISTANT

## 2021-07-19 PROCEDURE — 1036F TOBACCO NON-USER: CPT | Performed by: PHYSICIAN ASSISTANT

## 2021-07-19 NOTE — ASSESSMENT & PLAN NOTE
The current ASCCP guidelines were reviewed  Patient's last pap was 7/15/20 - WNL (-) ECC and therefore, a pap with HPV cotesting is not indicated at this time   I emphasized the importance of an annual pelvic and breast exam  Patient ok to defer pap today

## 2021-07-19 NOTE — PROGRESS NOTES
Assessment/Plan   Diagnoses and all orders for this visit:    Encounter for gynecological examination (general) (routine) without abnormal findings  The current ASCCP guidelines were reviewed  Patient's last pap was 7/15/20 - WNL (-) ECC and therefore, a pap with HPV cotesting is not indicated at this time  I emphasized the importance of an annual pelvic and breast exam  Patient ok to defer pap today  PCOS (polycystic ovarian syndrome)  -     metFORMIN (GLUCOPHAGE) 500 mg tablet; Take 1 tablet (500 mg total) by mouth 2 (two) times a day with meals  I have reviewed the criteria for diagnosis of PCOS and note that the patient has elevated fasting insulin, oligomenorrhea, history of ovarian cysts at a younger age; total testosterone WNL at 37 and free testosterone elevated at 7 8  I have counseled the patient on the risks of chronic anovulation, including the risk of endometrial hyperplasia as well as endometrial cancer and therefore, the importance of a withdrawal bleed at least once every 3 months  I reviewed various management options including but not limited to, regular exercise and diet modifications, metformin, and hormonal therapy (progesterone only due to history of migraine with aura)  We discussed strategies for weight loss in light of the metabolic risks of hypertension, diabetes as well as hyperlipidemia and coronary artery disease in the long term  We discussed the risks and benefits of starting metformin for insulin resistance  Patient opts to trial metformin, never tolerated hormonal therapy well in the past  Reviewed starting metformin 500 mg 1 tab po daily with meals  After 1 week of therapy, increase to 1 tab po in AM and 1 tab po in PM with meals  Reviewed GI side effects commonly experienced with metformin  Ideally treatment for PCOS is 1000 mg BID so ultimately can work up to if tolerates  Reviewed tommie-inositol as an alternative to metformin therapy   Will start with prescription first and see how she tolerates  All questions have been answered to her satisfaction  RTO in 3 months for follow-up to metformin start-up  Discussion  I have discussed the importance of monthly self-breast exams, exercise and healthy diet as well as adequate intake of calcium and vitamin D  Encourage MVI q day and r/[place]nille importance of folic acid; Encourage 30-40 min weight bearing exercise most days of week  Encourage safe sexual practices; STD testing - declines  Contraception - BTL  Breast cancer screening is not indicated at this time  The patient has had the Gardasil vaccine series, which is recommended for patients from 545 years of age  All questions have been answered to her satisfaction  RTO for APE or sooner if needed    Subjective     HPI   King Farah is a 28 y o  female who presents for annual well woman exam    Menarche - 16; LMP - 7/13/21; Periods are irreg about every 1-3 months (history of PCOS - elevated fasting insulin, oligomenorrhea, history of ovarian cysts at a younger age; total testosterone WNL at 37 and free testosterone elevated at 7 8) and last 5-7 days; No excessive bleeding; No intermenstrual bleeding or spotting; Cramps are tolerable  Patient sister just diagnosed with PCOS as well and started on metformin; Patient very concerned about her diabetes risk with her history of PCOS now, GDM in the past, and FH DM with mother and father  She also has had difficulty with weight gain and losing it  No vulvar itch/burn; No vaginal itch/burn; No abn discharge or odor;  No urinary sx - burning/pain/frequency/hematuria  (+) SBEs - no breast masses, asymmetry, nipple discharge or bleeding, changes in skin of breast, or breast tenderness bilaterally  No abd/pelvic pain; (+) Has daily for the past month - had relief with period and no HA for the past 3 days; I see after her visit under her problem list history of migraine with aura but I did not question her aura during the visit which would contraindicate estrogen   Pt is sexually active in a mutually monog/ sexual relationship; No issues with intercourse; She declines std/hiv/hep testing; Feels safe at home  Current contraception: BTL  Gardasil - received x 3  (+) PCP for routine Bw/care; Last Pap - 7/15/20 - WNL (-) ECC   History of abnormal Pap smear: yes once and WNL since  Last STI screen - 7/15/20 - C/G neg; 3/27/21 - STI labs WNL    Review of Systems   Constitutional: Positive for unexpected weight change (weight gain - hard to lose)  Negative for activity change, fatigue and fever  HENT: Positive for ear pain (seeing ENT for chronic ear infections)  Negative for congestion, dental problem, sinus pressure and sinus pain  Eyes: Negative for visual disturbance  Respiratory: Negative for cough, shortness of breath and wheezing  Cardiovascular: Negative for chest pain and leg swelling  Gastrointestinal: Negative for abdominal distention, abdominal pain, blood in stool, constipation, diarrhea, nausea and vomiting  Endocrine: Negative for polydipsia  Genitourinary: Negative for difficulty urinating, dyspareunia, dysuria, frequency, hematuria, menstrual problem, pelvic pain, urgency, vaginal bleeding, vaginal discharge and vaginal pain  Musculoskeletal: Negative for arthralgias and back pain  Allergic/Immunologic: Negative for environmental allergies  Neurological: Negative for dizziness, seizures and headaches  Psychiatric/Behavioral: Negative for dysphoric mood and sleep disturbance  The patient is not nervous/anxious          The following portions of the patient's history were reviewed and updated as appropriate: allergies, current medications, past family history, past medical history, past social history, past surgical history and problem list          OB History        2    Para   2    Term   2            AB        Living           SAB        TAB        Ectopic        Multiple        Live Births Obstetric Comments   : C/S x 2             Past Medical History:   Diagnosis Date    Allergic rhinitis 2010    Anemia     Gestational diabetes     Hyperemesis gravidarum, antepartum     Lump in neck 2011    Migraines     Palpitations 2011    Paronychia of finger     Viral gastroenteritis     Wears glasses        Past Surgical History:   Procedure Laterality Date    APPENDECTOMY       SECTION      x2    TUBAL LIGATION         Family History   Problem Relation Age of Onset    Depression Mother     Diabetes Mother     Hypertension Mother     Heart block Mother     Diabetes Father     Depression Family        Social History     Socioeconomic History    Marital status: Single     Spouse name: Not on file    Number of children: Not on file    Years of education: Not on file    Highest education level: Not on file   Occupational History    Not on file   Tobacco Use    Smoking status: Former Smoker     Quit date:      Years since quitting: 3 5    Smokeless tobacco: Never Used    Tobacco comment: prior-occ cig, now not smoking   Vaping Use    Vaping Use: Never used   Substance and Sexual Activity    Alcohol use: Yes     Comment: occasional     Drug use: No    Sexual activity: Yes     Birth control/protection: Female Sterilization   Other Topics Concern    Not on file   Social History Narrative    Not on file     Social Determinants of Health     Financial Resource Strain:     Difficulty of Paying Living Expenses:    Food Insecurity:     Worried About Running Out of Food in the Last Year:     Ran Out of Food in the Last Year:    Transportation Needs:     Lack of Transportation (Medical):      Lack of Transportation (Non-Medical):    Physical Activity:     Days of Exercise per Week:     Minutes of Exercise per Session:    Stress:     Feeling of Stress :    Social Connections:     Frequency of Communication with Friends and Family:     Frequency of Social Gatherings with Friends and Family:     Attends Jehov[employer]'s witness Services:     Active Member of Clubs or Organizations:     Attends Club or Organization Meetings:     Marital Status:    Intimate Partner Violence:     Fear of Current or Ex-Partner:     Emotionally Abused:     Physically Abused:     Sexually Abused:          Current Outpatient Medications:     acetaminophen (TYLENOL) 325 mg tablet, Take 650 mg by mouth every 6 (six) hours as needed for mild pain, Disp: , Rfl:     ibuprofen (MOTRIN) 200 mg tablet, Take by mouth every 6 (six) hours as needed for mild pain, Disp: , Rfl:     clotrimazole-betamethasone (LOTRISONE) 1-0 05 % cream, Apply topically 2 (two) times a day (Patient not taking: Reported on 7/19/2021), Disp: 30 g, Rfl: 0    fluticasone (FLONASE) 50 mcg/act nasal spray, 2 sprays into each nostril daily (Patient not taking: Reported on 7/19/2021), Disp: 1 Bottle, Rfl: 0    Allergies   Allergen Reactions    Vicodin [Hydrocodone-Acetaminophen] Anaphylaxis     No epi pen    Amoxicillin Hives     No epi pen    Zyrtec [Cetirizine] Hives     No epi pen       Objective   Vitals:    07/19/21 1108   BP: 124/84   BP Location: Left arm   Patient Position: Sitting   Cuff Size: Standard   Weight: 75 7 kg (166 lb 12 8 oz)   Height: 4' 11" (1 499 m)     Physical Exam  Vitals reviewed  Constitutional:       General: She is awake  She is not in acute distress  Appearance: Normal appearance  She is well-developed and well-groomed  She is not diaphoretic  Eyes:      Conjunctiva/sclera: Conjunctivae normal    Neck:      Thyroid: No thyroid mass, thyromegaly or thyroid tenderness  Cardiovascular:      Rate and Rhythm: Normal rate and regular rhythm  Heart sounds: Normal heart sounds  No murmur heard  Pulmonary:      Effort: Pulmonary effort is normal  No tachypnea, bradypnea or respiratory distress  Breath sounds: Normal breath sounds   No stridor or decreased air movement  No wheezing  Chest:      Breasts: Breasts are symmetrical          Right: Normal  No swelling, bleeding, inverted nipple, mass, nipple discharge, skin change or tenderness  Left: Normal  No swelling, bleeding, inverted nipple, mass, nipple discharge, skin change or tenderness  Abdominal:      General: There is no distension  Palpations: Abdomen is soft  There is no hepatomegaly, splenomegaly or mass  Tenderness: There is no abdominal tenderness  Hernia: No hernia is present  There is no hernia in the left inguinal area or right inguinal area  Genitourinary:     General: Normal vulva  Exam position: Supine  Pubic Area: No rash or pubic lice  Labia:         Right: No rash, tenderness, lesion or injury  Left: No rash, tenderness, lesion or injury  Urethra: No prolapse, urethral pain, urethral swelling or urethral lesion  Vagina: Normal  No signs of injury and foreign body  No vaginal discharge, erythema, tenderness, bleeding, lesions or prolapsed vaginal walls  Cervix: No cervical motion tenderness, discharge, friability, lesion, erythema or cervical bleeding  Uterus: Not deviated, not enlarged, not fixed, not tender and no uterine prolapse  Adnexa:         Right: No mass, tenderness or fullness  Left: No mass, tenderness or fullness  Lymphadenopathy:      Cervical: No cervical adenopathy  Upper Body:      Right upper body: No supraclavicular or axillary adenopathy  Left upper body: No supraclavicular or axillary adenopathy  Lower Body: No right inguinal adenopathy  No left inguinal adenopathy  Skin:     General: Skin is warm and dry  Neurological:      Mental Status: She is alert and oriented to person, place, and time  Psychiatric:         Mood and Affect: Mood and affect normal          Speech: Speech normal          Behavior: Behavior normal  Behavior is cooperative           Thought Content: Thought content normal          Judgment: Judgment normal

## 2021-07-19 NOTE — ASSESSMENT & PLAN NOTE
I have reviewed the criteria for diagnosis of PCOS and note that the patient has elevated fasting insulin, oligomenorrhea, history of ovarian cysts at a younger age; total testosterone WNL at 37 and free testosterone elevated at 7 8  I have counseled the patient on the risks of chronic anovulation, including the risk of endometrial hyperplasia as well as endometrial cancer and therefore, the importance of a withdrawal bleed at least once every 3 months  I reviewed various management options including but not limited to, regular exercise and diet modifications, metformin, and hormonal therapy (progesterone only due to history of migraine with aura)  We discussed strategies for weight loss in light of the metabolic risks of hypertension, diabetes as well as hyperlipidemia and coronary artery disease in the long term  We discussed the risks and benefits of starting metformin for insulin resistance  Patient opts to trial metformin, never tolerated hormonal therapy well in the past  Reviewed starting metformin 500 mg 1 tab po daily with meals  After 1 week of therapy, increase to 1 tab po in AM and 1 tab po in PM with meals  Reviewed GI side effects commonly experienced with metformin  Ideally treatment for PCOS is 1000 mg BID so ultimately can work up to if tolerates  Reviewed tommie-inositol as an alternative to metformin therapy  Will start with prescription first and see how she tolerates  All questions have been answered to her satisfaction  RTO in 3 months for follow-up to metformin start-up

## 2021-07-21 ENCOUNTER — TELEPHONE (OUTPATIENT)
Dept: OBGYN CLINIC | Facility: CLINIC | Age: 31
End: 2021-07-21

## 2021-07-21 NOTE — TELEPHONE ENCOUNTER
I would encourage a low glycemic/low sugar diet and regular/daily exercise to help with weight loss while on the metformin

## 2021-08-13 ENCOUNTER — CONSULT (OUTPATIENT)
Dept: MULTI SPECIALTY CLINIC | Facility: CLINIC | Age: 31
End: 2021-08-13

## 2021-08-13 VITALS
SYSTOLIC BLOOD PRESSURE: 122 MMHG | HEART RATE: 108 BPM | WEIGHT: 167 LBS | DIASTOLIC BLOOD PRESSURE: 79 MMHG | HEIGHT: 59 IN | TEMPERATURE: 98.6 F | BODY MASS INDEX: 33.67 KG/M2

## 2021-08-13 DIAGNOSIS — M26.623 BILATERAL TEMPOROMANDIBULAR JOINT PAIN: Primary | ICD-10-CM

## 2021-08-13 DIAGNOSIS — H61.22 HEARING LOSS OF LEFT EAR DUE TO CERUMEN IMPACTION: ICD-10-CM

## 2021-08-13 PROCEDURE — 69210 REMOVE IMPACTED EAR WAX UNI: CPT | Performed by: OTOLARYNGOLOGY

## 2021-08-13 PROCEDURE — 99203 OFFICE O/P NEW LOW 30 MIN: CPT | Performed by: OTOLARYNGOLOGY

## 2021-08-13 NOTE — PROGRESS NOTES
Consultation - Otolaryngology - Head and Neck Surgery  Facial Plastic and Reconstructive Surgery  Maribell Reyes 28 y o  female MRN: [id number]  Encounter: [id number]        Assessment/Plan:  1  Bilateral temporomandibular joint pain  Ambulatory Referral to Otolaryngology   2  Hearing loss of left ear due to cerumen impaction         Cerumen impaction:  We discussed the nature of cerumen impaction  We discussed appropriate treat with hydrogen peroxide 4-5 drops once per week  We discussed discontinuing the use of any Q-Tips or other objects into the ear  Temporomandibular joint syndrome: We discussed soft diet for 2 weeks, appropriate use of NSAIDs for 1 week, warm compresses, massage and PT for TMJ therapy  Will refer to PT for TMJ and follow up PRN  History of Present Illness   Physician Requesting Consult: Jeanna Rivera PA-C   Reason for Consult / Principal Problem:  Left-sided otalgia and hearing loss  HPI: Maribell Reyes is a 28y o  year old female who presents with  18 month history of left-sided otalgia and hearing loss  She notes a long history of grinding her teeth since she was a child  Recently she cracked an upper molar due to grinding her teeth and had it filled by dentistry less than 1 year ago  Approximately 18 months ago she had a bout of mono and her ear was attempted to be debrided in the emergency room due to cerumen impaction  This caused her some otalgia  She has since been seen by patient 1st 4 different times with the diagnosis of left-sided acute otitis media and prescribed antibiotics  She is currently taking antibiotics for infection  She notes persistent decrease in her hearing since her visit to the emergency room 18 months ago  She had an attempt at flushing her ear at patient 1st 1 month ago  No ear drainage  Review of systems:  10 Point ROS was performed and negative except as above or otherwise noted in the medical record      Historical Information   Past Medical History:   Diagnosis Date    Allergic rhinitis 2010    Anemia     Gestational diabetes     Hyperemesis gravidarum, antepartum     Lump in neck 2011    Migraines     Palpitations 2011    Paronychia of finger     PCOS (polycystic ovarian syndrome)     Viral gastroenteritis     Wears glasses      Past Surgical History:   Procedure Laterality Date    APPENDECTOMY       SECTION      x2    TUBAL LIGATION       Social History   Social History     Substance and Sexual Activity   Alcohol Use Yes    Comment: occasional      Social History     Substance and Sexual Activity   Drug Use No     Social History     Tobacco Use   Smoking Status Former Smoker    Quit date:     Years since quitting: 3 6   Smokeless Tobacco Never Used   Tobacco Comment    prior-occ cig, now not smoking     Family History:   Family History   Problem Relation Age of Onset    Depression Mother     Diabetes Mother     Hypertension Mother     Heart block Mother     Diabetes Father     Depression Family        Current Outpatient Medications on File Prior to Visit   Medication Sig    acetaminophen (TYLENOL) 325 mg tablet Take 650 mg by mouth every 6 (six) hours as needed for mild pain    ibuprofen (MOTRIN) 200 mg tablet Take by mouth every 6 (six) hours as needed for mild pain    metFORMIN (GLUCOPHAGE) 500 mg tablet Take 1 tablet (500 mg total) by mouth 2 (two) times a day with meals    MULTIPLE VITAMIN PO Take by mouth     No current facility-administered medications on file prior to visit  Allergies   Allergen Reactions    Vicodin [Hydrocodone-Acetaminophen] Anaphylaxis     No epi pen    Amoxicillin Hives     No epi pen    Zyrtec [Cetirizine] Hives     No epi pen       Vitals:    21 1438   BP: 122/79   Pulse: (!) 108   Temp: 98 6 °F (37 °C)       Physical Exam   Constitutional: Oriented to person, place, and time   Well-developed and well-nourished, no apparent distress, non-toxic appearance  Cooperative, able to hear and answer questions without difficulty  Voice: Normal voice quality  Head: Normocephalic, atraumatic  No scars, masses or lesions  Face: Symmetric, no edema, no sinus tenderness  Eyes: Vision grossly intact, extra-ocular movement intact  Ears: External ears normal  Tympanic membranes intact with intact normal landmarks  No post-auricular erythema or tenderness  Nose: Septum intact, nares clear  Mucosa moist, turbinates well appearing  No crusting, polyps or discharge evident  Oral cavity: Dentition intact  Mucosa moist, lips without lesions or masses  Tongue mobile, floor of mouth soft and flat  Hard palate intact  No masses or lesions  Oropharynx: Uvula is midline, soft palate intact without lesion or mass  Oropharyngeal inlet without obstruction  Tonsils unremarkable  Posterior pharyngeal wall clear  No masses or lesions  Salivary glands:  Parotid glands and submandibular glands symmetric, no enlargement or tenderness  Neck: Normal laryngeal elevation with swallow  Trachea midline  No masses or lesions  No palpable adenopathy  Thyroid: Without tenderness or palpable nodules  Pulmonary/Chest: Normal effort and rate  No respiratory distress  No stertor or stridor  Musculoskeletal: Normal range of motion  Neurological: Cranial nerves 2-12 intact  Skin: Skin is warm and dry  Psychiatric: Normal mood and affect  Procedure: Cerumen debridement left    Indications: Cerumen impaction left    Procedure in detail: After informed verbal consent was obtained the ear was visualized using microscopy  Using cerumen loop, suction, and alligator forceps the cerumen was debrided and the findings below were seen  The patient tolerated the procedure well  FINDINGS:  Left complete cerumen impaction  No middle ear effusion  All hearing decrease improved with removal of the cerumen  Imaging Studies: I have personally reviewed pertinent reports        Lab Results: I have personally reviewed pertinent lab results        Records reviewed: Dr Mario Briones records from 1/12/2021

## 2021-08-18 ENCOUNTER — TELEPHONE (OUTPATIENT)
Dept: OBGYN CLINIC | Facility: CLINIC | Age: 31
End: 2021-08-18

## 2021-08-18 NOTE — TELEPHONE ENCOUNTER
Pt LM would like to get Diflucan ordered for a yeast infection  Pt states she is on an antibiotic and starting to experience symptoms

## 2021-08-18 NOTE — TELEPHONE ENCOUNTER
Pt states symptoms are irritation and itch external  Denies discharge, swelling, pelvic pain or odor  Pt started abx Monday  Taking two different abx  Pt is going to try coconut oil and a probiotic while taking abx  Pt aware to contact office if symptoms worsen or do not improve

## 2021-08-25 ENCOUNTER — TELEPHONE (OUTPATIENT)
Dept: FAMILY MEDICINE CLINIC | Facility: CLINIC | Age: 31
End: 2021-08-25

## 2021-08-25 NOTE — TELEPHONE ENCOUNTER
Patient was seen at urgent care two weeks ago for cat bite  It became infected & she has been on antibiotics since  She wants to know if it would be okay for her to go to the beach tomorrow for the day?

## 2021-09-10 DIAGNOSIS — B37.3 VAGINAL YEAST INFECTION: Primary | ICD-10-CM

## 2021-09-10 RX ORDER — FLUCONAZOLE 150 MG/1
150 TABLET ORAL EVERY OTHER DAY
Qty: 2 TABLET | Refills: 0 | Status: SHIPPED | OUTPATIENT
Start: 2021-09-10 | End: 2021-09-11 | Stop reason: SDUPTHER

## 2021-09-10 NOTE — TELEPHONE ENCOUNTER
Pt called and said she's having symptoms of a yeast infection, tried using monistat and took a while and wants pills to work faster  Feels itchy and uncomfortable  She would like diflucan  Pt wants a call back

## 2021-09-10 NOTE — TELEPHONE ENCOUNTER
Pt confirmed pharmacy  Pt aware if symptoms do not resolve to contact office to discuss getting an apt

## 2021-09-11 ENCOUNTER — NURSE TRIAGE (OUTPATIENT)
Dept: OTHER | Facility: OTHER | Age: 31
End: 2021-09-11

## 2021-09-11 DIAGNOSIS — B37.3 VAGINAL YEAST INFECTION: ICD-10-CM

## 2021-09-11 RX ORDER — FLUCONAZOLE 150 MG/1
150 TABLET ORAL EVERY OTHER DAY
Qty: 2 TABLET | Refills: 0 | Status: SHIPPED | OUTPATIENT
Start: 2021-09-11 | End: 2021-09-14

## 2021-09-11 NOTE — TELEPHONE ENCOUNTER
Regarding: OBGYN - Antibiotic  ----- Message from Zaheer Calles sent at 9/11/2021  9:34 AM EDT -----  "I was supposed to have an antibiotic sent to [place] yesterday for a yeast infection, but their system was down, so they didn't receive the script   I'm wondering if I can get it resent "
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
## 2021-09-11 NOTE — TELEPHONE ENCOUNTER
Reason for Disposition   [1] Prescription prescribed recently is not at pharmacy AND [2] triager has access to patient's EMR AND [3] prescription is recorded in the EMR    Answer Assessment - Initial Assessment Questions  1  NAME of MEDICATION: "What medicine are you calling about?"      Diflucan 150mgs  2  QUESTION: "What is your question?" (e g , medication refill, side effect)      System was down at [facility] yesterday and they did not receive it  3  PRESCRIBING HCP: "Who prescribed it?" Reason: if prescribed by specialist, call should be referred to that group  Dr Juno Cunha  4  SYMPTOMS: "Do you have any symptoms?"      Vaginal Yeast infection  5  SEVERITY: If symptoms are present, ask "Are they mild, moderate or severe?"      Moderate  6   PREGNANCY:  "Is there any chance that you are pregnant?" "When was your last menstrual period?"      No    Protocols used: MEDICATION QUESTION CALL-ADULT-

## 2021-09-23 ENCOUNTER — TELEPHONE (OUTPATIENT)
Dept: OTHER | Facility: OTHER | Age: 31
End: 2021-09-23

## 2021-10-11 ENCOUNTER — OFFICE VISIT (OUTPATIENT)
Dept: OBGYN CLINIC | Facility: CLINIC | Age: 31
End: 2021-10-11
Payer: COMMERCIAL

## 2021-10-11 VITALS
SYSTOLIC BLOOD PRESSURE: 122 MMHG | BODY MASS INDEX: 33.26 KG/M2 | HEIGHT: 59 IN | WEIGHT: 165 LBS | DIASTOLIC BLOOD PRESSURE: 80 MMHG

## 2021-10-11 DIAGNOSIS — E28.2 PCOS (POLYCYSTIC OVARIAN SYNDROME): Primary | ICD-10-CM

## 2021-10-11 DIAGNOSIS — N64.4 BREAST TENDERNESS IN FEMALE: ICD-10-CM

## 2021-10-11 PROCEDURE — 99213 OFFICE O/P EST LOW 20 MIN: CPT | Performed by: PHYSICIAN ASSISTANT

## 2022-02-04 ENCOUNTER — TELEPHONE (OUTPATIENT)
Dept: OBGYN CLINIC | Facility: CLINIC | Age: 32
End: 2022-02-04

## 2022-02-04 NOTE — TELEPHONE ENCOUNTER
Annual scheduled for 7/22/22, last apt 7/19/22  Pt stopped her metformin for almost 2 months  LMP 11/30/21-12/6/21 Do not remember having cycle since then, pt will start medication back up  Under a lot of stress in December  Pt is going to monitor her cycle if she does not get it in the next couple week will call the office back to discuss   Pt had a tubal

## 2022-02-14 ENCOUNTER — TELEPHONE (OUTPATIENT)
Dept: OBGYN CLINIC | Facility: CLINIC | Age: 32
End: 2022-02-14

## 2022-02-14 DIAGNOSIS — E28.2 PCOS (POLYCYSTIC OVARIAN SYNDROME): ICD-10-CM
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
## 2022-05-14 DIAGNOSIS — E28.2 PCOS (POLYCYSTIC OVARIAN SYNDROME): ICD-10-CM

## 2022-05-25 ENCOUNTER — HOSPITAL ENCOUNTER (EMERGENCY)
Facility: HOSPITAL | Age: 32
Discharge: HOME | End: 2022-05-25
Attending: EMERGENCY MEDICINE
Payer: COMMERCIAL

## 2022-05-25 VITALS
HEART RATE: 86 BPM | TEMPERATURE: 98.1 F | SYSTOLIC BLOOD PRESSURE: 105 MMHG | OXYGEN SATURATION: 97 % | DIASTOLIC BLOOD PRESSURE: 75 MMHG | RESPIRATION RATE: 18 BRPM | WEIGHT: 158 LBS | BODY MASS INDEX: 31.91 KG/M2

## 2022-05-25 DIAGNOSIS — F41.9 ANXIETY: Primary | ICD-10-CM

## 2022-05-25 LAB
ALBUMIN SERPL BCP-MCNC: 4.3 G/DL (ref 3.5–5)
ALP SERPL-CCNC: 59 U/L (ref 34–104)
ALT SERPL W P-5'-P-CCNC: 14 U/L (ref 7–52)
AMPHETAMINES SERPL QL SCN: NEGATIVE
ANION GAP SERPL CALCULATED.3IONS-SCNC: 8 MMOL/L (ref 4–13)
AST SERPL W P-5'-P-CCNC: 11 U/L (ref 13–39)
ATRIAL RATE: 87 BPM
BARBITURATES UR QL: NEGATIVE
BASOPHILS # BLD AUTO: 0.05 THOUSANDS/ΜL (ref 0–0.1)
BASOPHILS NFR BLD AUTO: 1 % (ref 0–1)
BENZODIAZ UR QL: NEGATIVE
BILIRUB SERPL-MCNC: 0.49 MG/DL (ref 0.2–1)
BUN SERPL-MCNC: 9 MG/DL (ref 5–25)
CALCIUM SERPL-MCNC: 9.4 MG/DL (ref 8.4–10.2)
CHLORIDE SERPL-SCNC: 105 MMOL/L (ref 96–108)
CO2 SERPL-SCNC: 25 MMOL/L (ref 21–32)
COCAINE UR QL: NEGATIVE
CREAT SERPL-MCNC: 0.69 MG/DL (ref 0.6–1.3)
EOSINOPHIL # BLD AUTO: 0.04 THOUSAND/ΜL (ref 0–0.61)
EOSINOPHIL NFR BLD AUTO: 1 % (ref 0–6)
ERYTHROCYTE [DISTWIDTH] IN BLOOD BY AUTOMATED COUNT: 12.9 % (ref 11.6–15.1)
EXT PREG TEST URINE: NEGATIVE
EXT. CONTROL ED NAV: NORMAL
GFR SERPL CREATININE-BSD FRML MDRD: 116 ML/MIN/1.73SQ M
GLUCOSE SERPL-MCNC: 116 MG/DL (ref 65–140)
HCT VFR BLD AUTO: 39.3 % (ref 34.8–46.1)
HGB BLD-MCNC: 12.8 G/DL (ref 11.5–15.4)
IMM GRANULOCYTES # BLD AUTO: 0.02 THOUSAND/UL (ref 0–0.2)
IMM GRANULOCYTES NFR BLD AUTO: 0 % (ref 0–2)
LYMPHOCYTES # BLD AUTO: 1.11 THOUSANDS/ΜL (ref 0.6–4.47)
LYMPHOCYTES NFR BLD AUTO: 16 % (ref 14–44)
MCH RBC QN AUTO: 26.5 PG (ref 26.8–34.3)
MCHC RBC AUTO-ENTMCNC: 32.6 G/DL (ref 31.4–37.4)
MCV RBC AUTO: 81 FL (ref 82–98)
METHADONE UR QL: NEGATIVE
MONOCYTES # BLD AUTO: 0.23 THOUSAND/ΜL (ref 0.17–1.22)
MONOCYTES NFR BLD AUTO: 3 % (ref 4–12)
NEUTROPHILS # BLD AUTO: 5.41 THOUSANDS/ΜL (ref 1.85–7.62)
NEUTS SEG NFR BLD AUTO: 79 % (ref 43–75)
NRBC BLD AUTO-RTO: 0 /100 WBCS
OPIATES UR QL SCN: NEGATIVE
OXYCODONE+OXYMORPHONE UR QL SCN: NEGATIVE
P AXIS: 48 DEGREES
PCP UR QL: NEGATIVE
PLATELET # BLD AUTO: 329 THOUSANDS/UL (ref 149–390)
PMV BLD AUTO: 9.7 FL (ref 8.9–12.7)
POTASSIUM SERPL-SCNC: 3.8 MMOL/L (ref 3.5–5.3)
PR INTERVAL: 164 MS
PROT SERPL-MCNC: 7.6 G/DL (ref 6.4–8.4)
QRS AXIS: 11 DEGREES
QRSD INTERVAL: 78 MS
QT INTERVAL: 342 MS
QTC INTERVAL: 405 MS
RBC # BLD AUTO: 4.83 MILLION/UL (ref 3.81–5.12)
SODIUM SERPL-SCNC: 138 MMOL/L (ref 135–147)
T WAVE AXIS: 31 DEGREES
THC UR QL: NEGATIVE
TSH SERPL DL<=0.05 MIU/L-ACNC: 0.91 UIU/ML (ref 0.45–4.5)
VENTRICULAR RATE: 84 BPM
WBC # BLD AUTO: 6.86 THOUSAND/UL (ref 4.31–10.16)

## 2022-05-25 PROCEDURE — 36415 COLL VENOUS BLD VENIPUNCTURE: CPT

## 2022-05-25 PROCEDURE — 84443 ASSAY THYROID STIM HORMONE: CPT

## 2022-05-25 PROCEDURE — 80307 DRUG TEST PRSMV CHEM ANLYZR: CPT

## 2022-05-25 PROCEDURE — 85025 COMPLETE CBC W/AUTO DIFF WBC: CPT

## 2022-05-25 PROCEDURE — 80053 COMPREHEN METABOLIC PANEL: CPT

## 2022-05-25 PROCEDURE — 99285 EMERGENCY DEPT VISIT HI MDM: CPT | Performed by: EMERGENCY MEDICINE

## 2022-05-25 PROCEDURE — 93010 ELECTROCARDIOGRAM REPORT: CPT | Performed by: INTERNAL MEDICINE

## 2022-05-25 PROCEDURE — 93005 ELECTROCARDIOGRAM TRACING: CPT

## 2022-05-25 PROCEDURE — 99284 EMERGENCY DEPT VISIT MOD MDM: CPT

## 2022-05-25 PROCEDURE — 81025 URINE PREGNANCY TEST: CPT

## 2022-05-25 RX ORDER — HYDROXYZINE HYDROCHLORIDE 25 MG/1
25 TABLET, FILM COATED ORAL ONCE
Status: COMPLETED | OUTPATIENT
Start: 2022-05-25 | End: 2022-05-25

## 2022-05-25 RX ORDER — HYDROXYZINE HYDROCHLORIDE 25 MG/1
25 TABLET, FILM COATED ORAL EVERY 6 HOURS
Qty: 124 TABLET | Refills: 0 | Status: SHIPPED | OUTPATIENT
Start: 2022-05-25 | End: 2022-06-25

## 2022-05-25 RX ADMIN — HYDROXYZINE HYDROCHLORIDE 25 MG: 25 TABLET, FILM COATED ORAL at 11:56

## 2022-05-25 NOTE — ED NOTES
Dr Brandee Goldstein, ED Resident at bedside speaking with patient        Yolanda Urbina RN  05/25/22 [id number]
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
## 2022-05-25 NOTE — Clinical Note
Shanthi Carver was seen and treated in our emergency department on 5/25/2022  Diagnosis:     Roberta Mercado  may return to work on return date, may return to school on return date  She may return on this date: 05/30/2022    Can return sooner if feeling okay  If you have any questions or concerns, please don't hesitate to call        Christelle Daley MD    ______________________________           _______________          _______________  Hospital Representative                              Date                                Time

## 2022-05-25 NOTE — ED NOTES
This writer discussed the patients current presentation and recommended discharge plan with Dr Sonya Son  They agree with the patient being discharged at this time with referrals and/or information about: hotline / warm line numbers; walk-in clinic information; local outpatient resource list for therapists / counselors, psychologists, psychiatrists, and partial programs; and, online / internet resources and support groups  Advised to utilize natural and existing supports  Advised for safety planning and effective coping skills  Advised to return to ED if necessary  [place] Crisis Number: [place] [phone]  [place] Suicide Hotline: 6-173-464-977-344-7823  Crisis Text Line: Text Connect to 183265     The patient was Instructed to follow up with their PCP  This writer and the patient completed a safety plan  The patient was provided with a copy of their safety plan with encouragement to utilize the plan following discharge  In addition, the patient was instructed to call [facility] crisis, other crisis services, 911 or to go to the nearest ER immediately if their situation changes at any time  This writer discussed discharge plans with the patient and family, who agrees with and understands the discharge plans           SAFETY PLAN  Warning Signs (thoughts, images, mood, behavior, situations) of a potential crisis: heaviness in my chest, increased speed of speech and motions, looking to keep busy, worrying or imagining worst outcomes      Coping Skills (what can I do to take my mind off the problem, or to keep myself safe): do housework, go out with the kids to the park, listen to music, deep breathing      Outside Support (who can I reach out to for support and help): Oniel Valencia, [address] [place] [address] Suicide Prevention Hotline:  [place] [address] [facility] 2-427-377-536-732-1676 - [facility] Crisis/Mobile Crisis   [phone] - [address]: [facility]: [address] [facility] [phone] - Crisis   [phone] - Peer Support Talk Line (1-9pm daily)  [phone] - Teen Support Talk Line (1-9pm daily)  [address] ([place]) [phone] - [facility]

## 2022-05-25 NOTE — ED NOTES
Crisis Faith Lin at bedside      Naseemyeni Hoffarash, [address] [facility]  05/25/22 6266

## 2022-05-25 NOTE — ED ATTENDING ATTESTATION
5/25/2022  ILamin MD, saw and evaluated the patient  I have discussed the patient with the resident/non-physician practitioner and agree with the resident's/non-physician practitioner's findings, Plan of Care, and MDM as documented in the resident's/non-physician practitioner's note, except where noted  All available labs and Radiology studies were reviewed  I was present for key portions of any procedure(s) performed by the resident/non-physician practitioner and I was immediately available to provide assistance  At this point I agree with the current assessment done in the Emergency Department  I have conducted an independent evaluation of this patient a history and physical is as follows:    76-year-old female with remote history of anxiety as a teenager presenting for evaluation of anxiety  Patient states that over the last couple of weeks she has had progressive worsening of a generalized fear that something bad is going to happen to her  It is interfering with her ability to perform her daily activities and making her not want to leave her home  She vehemently denies any suicidal or homicidal ideation  No audiovisual hallucinations  She cannot think of any particular inciting factor  She attempted to make an outpatient appointment with a psychiatrist but was told that they could get her in for 7 months prompting her to come to the emergency department for evaluation  Physical Exam  Constitutional:       General: She is not in acute distress  Appearance: She is well-developed  She is not diaphoretic  HENT:      Head: Normocephalic and atraumatic  Right Ear: External ear normal       Left Ear: External ear normal       Nose: Nose normal    Eyes:      Conjunctiva/sclera: Conjunctivae normal    Cardiovascular:      Rate and Rhythm: Normal rate and regular rhythm  Heart sounds: Normal heart sounds  No murmur heard  No friction rub  No gallop     Pulmonary: Effort: Pulmonary effort is normal  No respiratory distress  Breath sounds: Normal breath sounds  No wheezing or rales  Abdominal:      General: Bowel sounds are normal  There is no distension  Palpations: Abdomen is soft  Tenderness: There is no abdominal tenderness  There is no guarding  Musculoskeletal:         General: No deformity  Normal range of motion  Cervical back: Normal range of motion and neck supple  Right lower leg: No edema  Left lower leg: No edema  Skin:     General: Skin is warm and dry  Neurological:      Mental Status: She is alert and oriented to person, place, and time  Motor: No abnormal muscle tone  Psychiatric:         Mood and Affect: Mood normal       Comments: Appears anxious  At times tearful  Denies SI, HI, or AVH  ED Course  ED Course as of 05/25/22 1750   Wed May 25, 2022   1345 Labs not indicative of underlying organic source for the patient's anxiety  No CP/shortness of breath to suggest PE and tachycardia improved without intervention  Patient does not meet criteria for inpatient psychiatric treatment at this time  Resources provided to establish with an outpatient provider and prescription provided for PRN atarax  Return precautions discussed            Critical Care Time  Procedures

## 2022-05-25 NOTE — Clinical Note
Patrick Magda Carver was seen and treated in our emergency department on 5/25/2022  Diagnosis:     Tacey Part  may return to work on return date, may return to school on return date  She may return on this date: 05/30/2022    Can return sooner if feeling okay  If you have any questions or concerns, please don't hesitate to call        Nasir Coombs MD    ______________________________           _______________          _______________  Hospital Representative                              Date                                Time
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
## 2022-05-25 NOTE — DISCHARGE INSTRUCTIONS
Return to ED if worsening symptoms, suicidal thoughts, homicidal thoughts, hallucinations, chest pain, shortness of breath, abdominal pain, nausea, vomiting  As discussed, follow-up with psychiatry  This writer discussed the patients current presentation and recommended discharge plan with Dr Lupe Moreira  They agree with the patient being discharged at this time with referrals and/or information about: hotline / warm line numbers; walk-in clinic information; local outpatient resource list for therapists / counselors, psychologists, psychiatrists, and partial programs; and, online / internet resources and support groups  Advised to utilize natural and existing supports  Advised for safety planning and effective coping skills  Advised to return to ED if necessary  [place] Crisis Number: Isaac More [phone]  National Suicide Hotline: 0-157.304.2639  Crisis Text Line: Text Connect to 886612     The patient was Instructed to follow up with their PCP  This writer and the patient completed a safety plan  The patient was provided with a copy of their safety plan with encouragement to utilize the plan following discharge  In addition, the patient was instructed to call local [facility] crisis, other crisis services, 911 or to go to the nearest ER immediately if their situation changes at any time  This writer discussed discharge plans with the patient and family, who agrees with and understands the discharge plans           SAFETY PLAN  Warning Signs (thoughts, images, mood, behavior, situations) of a potential crisis: heaviness in my chest, increased speed of speech and motions, looking to keep busy, worrying or imagining worst outcomes      Coping Skills (what can I do to take my mind off the problem, or to keep myself safe): do housework, go out with the kids to the park, listen to music, deep breathing      Outside Support (who can I reach out to for support and help): Baron Helms, [address] Suicide Prevention Hotline:  5-678.748.3271    [facility] [address] [facility] [phone] - [place] Crisis/Mobile Crisis   [address]: [phone]  [facility]: [place] [phone] - [address] [facility] [address] [phone] - Crisis   [phone] - Peer Support Talk Line (1-9pm daily)  [phone] - Teen Support Talk Line (1-9pm daily)  [address] [address] ([place]) [phone] - [address] [facility]

## 2022-05-26 ENCOUNTER — NURSE TRIAGE (OUTPATIENT)
Dept: OTHER | Facility: OTHER | Age: 32
End: 2022-05-26

## 2022-05-27 NOTE — TELEPHONE ENCOUNTER
Reason for Disposition   Caller has medicine question, adult has minor symptoms, caller declines triage, AND triager answers question    Answer Assessment - Initial Assessment Questions  1  NAME of MEDICATION: "What medicine are you calling about?"      Hydroxyzine 25 mg and Zoloft     2  QUESTION: "What is your question?" (e g , medication refill, side effect)      "I took 4 doses since yesterday, can I take more"    3  PRESCRIBING HCP: "Who prescribed it?" Reason: if prescribed by specialist, call should be referred to that group  Prescribed in ED    4  SYMPTOMS: "Do you have any symptoms?"      Anxiety    5  SEVERITY: If symptoms are present, ask "Are they mild, moderate or severe?"      Mild to moderate     6   PREGNANCY:  "Is there any chance that you are pregnant?" "When was your last menstrual period?"      denies    Protocols used: MEDICATION QUESTION CALL-ADULT-

## 2022-05-27 NOTE — TELEPHONE ENCOUNTER
Regarding: medciation dosage   ----- Message from Brandi Washington sent at 5/26/2022  8:16 PM EDT -----  "I was given hydrOXYzine HCL (ATARAX) 25 mg tablet and I taken 4 dosage and I don't know if I can take more of this "

## 2022-07-21 ENCOUNTER — ANNUAL EXAM (OUTPATIENT)
Dept: OBGYN CLINIC | Facility: CLINIC | Age: 32
End: 2022-07-21
Payer: COMMERCIAL

## 2022-07-21 VITALS
SYSTOLIC BLOOD PRESSURE: 118 MMHG | WEIGHT: 152 LBS | HEIGHT: 59 IN | DIASTOLIC BLOOD PRESSURE: 78 MMHG | BODY MASS INDEX: 30.64 KG/M2

## 2022-07-21 DIAGNOSIS — E28.2 PCOS (POLYCYSTIC OVARIAN SYNDROME): ICD-10-CM

## 2022-07-21 DIAGNOSIS — Z11.3 SCREENING FOR STD (SEXUALLY TRANSMITTED DISEASE): ICD-10-CM

## 2022-07-21 DIAGNOSIS — Z01.419 ENCOUNTER FOR GYNECOLOGICAL EXAMINATION (GENERAL) (ROUTINE) WITHOUT ABNORMAL FINDINGS: Primary | ICD-10-CM

## 2022-07-21 PROCEDURE — G0145 SCR C/V CYTO,THINLAYER,RESCR: HCPCS | Performed by: PHYSICIAN ASSISTANT

## 2022-07-21 PROCEDURE — 0503F POSTPARTUM CARE VISIT: CPT | Performed by: PHYSICIAN ASSISTANT

## 2022-07-21 PROCEDURE — G0476 HPV COMBO ASSAY CA SCREEN: HCPCS | Performed by: PHYSICIAN ASSISTANT

## 2022-07-21 PROCEDURE — 87591 N.GONORRHOEAE DNA AMP PROB: CPT | Performed by: PHYSICIAN ASSISTANT

## 2022-07-21 PROCEDURE — 87491 CHLMYD TRACH DNA AMP PROBE: CPT | Performed by: PHYSICIAN ASSISTANT

## 2022-07-21 PROCEDURE — 99395 PREV VISIT EST AGE 18-39: CPT | Performed by: PHYSICIAN ASSISTANT

## 2022-07-21 NOTE — ASSESSMENT & PLAN NOTE
The current ASCCP guidelines were reviewed  Patient's last pap was 7/15/20 - WNL (-) ECC and therefore, a pap with HPV cotesting is not indicated at this time   I emphasized the importance of an annual pelvic and breast exam  Patient opts to have a pap done today     0
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
## 2022-07-21 NOTE — ASSESSMENT & PLAN NOTE
Plans to restart her metformin therapy at 500 mg qd for the first 2 weeks, then will get back up to BID  Patient desires to continue if tolerates well since regulated her cycles back to monthly/q 28 days

## 2022-07-22 LAB
C TRACH DNA SPEC QL NAA+PROBE: NEGATIVE
HPV HR 12 DNA CVX QL NAA+PROBE: NEGATIVE
HPV16 DNA CVX QL NAA+PROBE: NEGATIVE
HPV18 DNA CVX QL NAA+PROBE: NEGATIVE
N GONORRHOEA DNA SPEC QL NAA+PROBE: NEGATIVE

## 2022-07-28 ENCOUNTER — TELEPHONE (OUTPATIENT)
Dept: OBGYN CLINIC | Facility: CLINIC | Age: 32
End: 2022-07-28

## 2022-07-28 LAB
LAB AP GYN PRIMARY INTERPRETATION: NORMAL
LAB AP LMP: NORMAL
Lab: NORMAL

## 2024-02-21 PROBLEM — Z00.00 HEALTHCARE MAINTENANCE: Status: RESOLVED | Noted: 2019-09-16 | Resolved: 2024-02-21

## 2024-02-21 PROBLEM — Z01.419 ENCOUNTER FOR GYNECOLOGICAL EXAMINATION (GENERAL) (ROUTINE) WITHOUT ABNORMAL FINDINGS: Status: RESOLVED | Noted: 2020-07-15 | Resolved: 2024-02-21

## 2024-07-09 DIAGNOSIS — E28.2 PCOS (POLYCYSTIC OVARIAN SYNDROME): ICD-10-CM

## 2024-07-09 NOTE — TELEPHONE ENCOUNTER
Medication: metFORMIN (GLUCOPHAGE) 500 mg tablet     Dose/Frequency:  Take 1 tablet (500 mg total) by mouth 2 (two) times a day with meals     Quantity: 180 tablet    Pharmacy: [facility] [id number] [facility]    Office:   [] PCP/Provider -   [x] Speciality/Provider - Lee Maza PA-C    Does the patient have enough for 3 days?   [] Yes   [x] No - Send as HP to POD     Patient has not taken in awhile and wanted refilled she has appointment coming up 9/26/24 that was the first available. Patient also wanted to make sure that Metformin was safe to take with Sertraline.

## 2024-10-31 DIAGNOSIS — E28.2 PCOS (POLYCYSTIC OVARIAN SYNDROME): ICD-10-CM
